# Patient Record
Sex: MALE | Race: WHITE | Employment: OTHER | URBAN - METROPOLITAN AREA
[De-identification: names, ages, dates, MRNs, and addresses within clinical notes are randomized per-mention and may not be internally consistent; named-entity substitution may affect disease eponyms.]

---

## 2017-01-06 ENCOUNTER — OFFICE VISIT (OUTPATIENT)
Dept: INTERNAL MEDICINE | Age: 82
End: 2017-01-06

## 2017-01-06 VITALS
DIASTOLIC BLOOD PRESSURE: 78 MMHG | SYSTOLIC BLOOD PRESSURE: 138 MMHG | HEART RATE: 87 BPM | BODY MASS INDEX: 21.95 KG/M2 | OXYGEN SATURATION: 97 % | WEIGHT: 153 LBS

## 2017-01-06 DIAGNOSIS — L85.3 DRY SKIN: ICD-10-CM

## 2017-01-06 DIAGNOSIS — R79.89 ELEVATED LIVER FUNCTION TESTS: Chronic | ICD-10-CM

## 2017-01-06 DIAGNOSIS — R73.9 HYPERGLYCEMIA: Chronic | ICD-10-CM

## 2017-01-06 DIAGNOSIS — E78.5 HYPERLIPIDEMIA, UNSPECIFIED HYPERLIPIDEMIA TYPE: Chronic | ICD-10-CM

## 2017-01-06 DIAGNOSIS — H26.9 CATARACT: Chronic | ICD-10-CM

## 2017-01-06 DIAGNOSIS — R21 RASH: ICD-10-CM

## 2017-01-06 DIAGNOSIS — M1A.9XX0 CHRONIC GOUT WITHOUT TOPHUS, UNSPECIFIED CAUSE, UNSPECIFIED SITE: Chronic | ICD-10-CM

## 2017-01-06 DIAGNOSIS — I10 ESSENTIAL HYPERTENSION: Primary | Chronic | ICD-10-CM

## 2017-01-06 DIAGNOSIS — M25.552 LEFT HIP PAIN: ICD-10-CM

## 2017-01-06 DIAGNOSIS — C44.310 BASAL CELL CARCINOMA OF FACE: Chronic | ICD-10-CM

## 2017-01-06 PROCEDURE — 99214 OFFICE O/P EST MOD 30 MIN: CPT | Performed by: INTERNAL MEDICINE

## 2017-01-07 LAB
ALBUMIN SERPL-MCNC: 4.5 G/DL (ref 3.4–5)
ALP BLD-CCNC: 69 U/L (ref 40–129)
ALT SERPL-CCNC: 33 U/L (ref 10–40)
ANION GAP SERPL CALCULATED.3IONS-SCNC: 14 MMOL/L (ref 3–16)
AST SERPL-CCNC: 26 U/L (ref 15–37)
BASOPHILS ABSOLUTE: 0.1 K/UL (ref 0–0.2)
BASOPHILS RELATIVE PERCENT: 0.7 %
BILIRUB SERPL-MCNC: 0.6 MG/DL (ref 0–1)
BILIRUBIN DIRECT: <0.2 MG/DL (ref 0–0.3)
BILIRUBIN URINE: NEGATIVE
BILIRUBIN, INDIRECT: NORMAL MG/DL (ref 0–1)
BLOOD, URINE: ABNORMAL
BUN BLDV-MCNC: 25 MG/DL (ref 7–20)
CALCIUM SERPL-MCNC: 9.7 MG/DL (ref 8.3–10.6)
CHLORIDE BLD-SCNC: 98 MMOL/L (ref 99–110)
CHOLESTEROL, TOTAL: 202 MG/DL (ref 0–199)
CLARITY: CLEAR
CO2: 28 MMOL/L (ref 21–32)
COLOR: YELLOW
CREAT SERPL-MCNC: 0.9 MG/DL (ref 0.8–1.3)
EOSINOPHILS ABSOLUTE: 0.3 K/UL (ref 0–0.6)
EOSINOPHILS RELATIVE PERCENT: 3.3 %
EPITHELIAL CELLS, UA: 0 /HPF (ref 0–5)
GFR AFRICAN AMERICAN: >60
GFR NON-AFRICAN AMERICAN: >60
GLUCOSE BLD-MCNC: 111 MG/DL (ref 70–99)
GLUCOSE URINE: NEGATIVE MG/DL
HCT VFR BLD CALC: 49.6 % (ref 40.5–52.5)
HDLC SERPL-MCNC: 43 MG/DL (ref 40–60)
HEMOGLOBIN: 16.5 G/DL (ref 13.5–17.5)
HYALINE CASTS: 0 /HPF (ref 0–8)
KETONES, URINE: NEGATIVE MG/DL
LDL CHOLESTEROL CALCULATED: ABNORMAL MG/DL
LDL CHOLESTEROL DIRECT: 113 MG/DL
LEUKOCYTE ESTERASE, URINE: NEGATIVE
LYMPHOCYTES ABSOLUTE: 2.6 K/UL (ref 1–5.1)
LYMPHOCYTES RELATIVE PERCENT: 29.5 %
MCH RBC QN AUTO: 30.5 PG (ref 26–34)
MCHC RBC AUTO-ENTMCNC: 33.3 G/DL (ref 31–36)
MCV RBC AUTO: 91.6 FL (ref 80–100)
MICROSCOPIC EXAMINATION: YES
MONOCYTES ABSOLUTE: 1 K/UL (ref 0–1.3)
MONOCYTES RELATIVE PERCENT: 11.2 %
NEUTROPHILS ABSOLUTE: 4.9 K/UL (ref 1.7–7.7)
NEUTROPHILS RELATIVE PERCENT: 55.3 %
NITRITE, URINE: NEGATIVE
PDW BLD-RTO: 12.9 % (ref 12.4–15.4)
PH UA: 6.5
PHOSPHORUS: 3.1 MG/DL (ref 2.5–4.9)
PLATELET # BLD: 209 K/UL (ref 135–450)
PMV BLD AUTO: 7.9 FL (ref 5–10.5)
POTASSIUM SERPL-SCNC: 4.8 MMOL/L (ref 3.5–5.1)
PROTEIN UA: NEGATIVE MG/DL
RBC # BLD: 5.42 M/UL (ref 4.2–5.9)
RBC UA: 3 /HPF (ref 0–4)
SODIUM BLD-SCNC: 140 MMOL/L (ref 136–145)
SPECIFIC GRAVITY UA: 1.02
TOTAL PROTEIN: 6.8 G/DL (ref 6.4–8.2)
TRIGL SERPL-MCNC: 325 MG/DL (ref 0–150)
TSH SERPL DL<=0.05 MIU/L-ACNC: 1.76 UIU/ML (ref 0.27–4.2)
URIC ACID, SERUM: 4.3 MG/DL (ref 3.5–7.2)
URINE TYPE: ABNORMAL
UROBILINOGEN, URINE: 0.2 E.U./DL
VLDLC SERPL CALC-MCNC: ABNORMAL MG/DL
WBC # BLD: 8.8 K/UL (ref 4–11)
WBC UA: 1 /HPF (ref 0–5)

## 2017-01-07 ASSESSMENT — ENCOUNTER SYMPTOMS
BACK PAIN: 0
RESPIRATORY NEGATIVE: 1
EYES NEGATIVE: 1
GASTROINTESTINAL NEGATIVE: 1

## 2017-01-08 LAB
ESTIMATED AVERAGE GLUCOSE: 111.2 MG/DL
HBA1C MFR BLD: 5.5 %

## 2017-01-09 ENCOUNTER — HOSPITAL ENCOUNTER (OUTPATIENT)
Dept: OTHER | Age: 82
Discharge: HOME | End: 2017-01-09
Attending: INTERNAL MEDICINE | Admitting: INTERNAL MEDICINE

## 2017-01-09 DIAGNOSIS — M25.552 LEFT HIP PAIN: ICD-10-CM

## 2017-06-20 ENCOUNTER — TELEPHONE (OUTPATIENT)
Dept: INTERNAL MEDICINE | Age: 82
End: 2017-06-20

## 2017-07-06 ENCOUNTER — OFFICE VISIT (OUTPATIENT)
Dept: INTERNAL MEDICINE | Age: 82
End: 2017-07-06

## 2017-07-06 VITALS
OXYGEN SATURATION: 97 % | DIASTOLIC BLOOD PRESSURE: 86 MMHG | HEART RATE: 76 BPM | SYSTOLIC BLOOD PRESSURE: 144 MMHG | HEIGHT: 70 IN | BODY MASS INDEX: 21.62 KG/M2 | WEIGHT: 151 LBS

## 2017-07-06 DIAGNOSIS — E78.5 HYPERLIPIDEMIA, UNSPECIFIED HYPERLIPIDEMIA TYPE: Chronic | ICD-10-CM

## 2017-07-06 DIAGNOSIS — I10 ESSENTIAL HYPERTENSION: Primary | Chronic | ICD-10-CM

## 2017-07-06 DIAGNOSIS — M1A.9XX0 CHRONIC GOUT WITHOUT TOPHUS, UNSPECIFIED CAUSE, UNSPECIFIED SITE: Chronic | ICD-10-CM

## 2017-07-06 PROCEDURE — 99214 OFFICE O/P EST MOD 30 MIN: CPT | Performed by: INTERNAL MEDICINE

## 2017-07-06 ASSESSMENT — PATIENT HEALTH QUESTIONNAIRE - PHQ9
2. FEELING DOWN, DEPRESSED OR HOPELESS: 0
1. LITTLE INTEREST OR PLEASURE IN DOING THINGS: 0
SUM OF ALL RESPONSES TO PHQ9 QUESTIONS 1 & 2: 0
SUM OF ALL RESPONSES TO PHQ QUESTIONS 1-9: 0

## 2017-07-06 ASSESSMENT — ENCOUNTER SYMPTOMS
GASTROINTESTINAL NEGATIVE: 1
EYES NEGATIVE: 1
SHORTNESS OF BREATH: 0
ORTHOPNEA: 0
BLURRED VISION: 0
RESPIRATORY NEGATIVE: 1

## 2017-07-13 LAB
ALBUMIN SERPL-MCNC: 4.3 G/DL (ref 3.4–5)
ANION GAP SERPL CALCULATED.3IONS-SCNC: 13 MMOL/L (ref 3–16)
BUN BLDV-MCNC: 22 MG/DL (ref 7–20)
CALCIUM SERPL-MCNC: 9.2 MG/DL (ref 8.3–10.6)
CHLORIDE BLD-SCNC: 100 MMOL/L (ref 99–110)
CHOLESTEROL, TOTAL: 179 MG/DL (ref 0–199)
CO2: 26 MMOL/L (ref 21–32)
CREAT SERPL-MCNC: 0.9 MG/DL (ref 0.8–1.3)
GFR AFRICAN AMERICAN: >60
GFR NON-AFRICAN AMERICAN: >60
GLUCOSE BLD-MCNC: 106 MG/DL (ref 70–99)
HDLC SERPL-MCNC: 48 MG/DL (ref 40–60)
LDL CHOLESTEROL CALCULATED: 95 MG/DL
PHOSPHORUS: 3 MG/DL (ref 2.5–4.9)
POTASSIUM SERPL-SCNC: 4.5 MMOL/L (ref 3.5–5.1)
SODIUM BLD-SCNC: 139 MMOL/L (ref 136–145)
TRIGL SERPL-MCNC: 182 MG/DL (ref 0–150)
VLDLC SERPL CALC-MCNC: 36 MG/DL

## 2017-07-25 ENCOUNTER — TELEPHONE (OUTPATIENT)
Dept: INTERNAL MEDICINE | Age: 82
End: 2017-07-25

## 2017-11-03 ENCOUNTER — TELEPHONE (OUTPATIENT)
Dept: INTERNAL MEDICINE CLINIC | Age: 82
End: 2017-11-03

## 2017-11-03 ENCOUNTER — OFFICE VISIT (OUTPATIENT)
Dept: INTERNAL MEDICINE | Age: 82
End: 2017-11-03

## 2017-11-03 ENCOUNTER — TELEPHONE (OUTPATIENT)
Dept: INTERNAL MEDICINE | Age: 82
End: 2017-11-03

## 2017-11-03 VITALS
RESPIRATION RATE: 14 BRPM | BODY MASS INDEX: 22.33 KG/M2 | HEIGHT: 70 IN | WEIGHT: 156 LBS | SYSTOLIC BLOOD PRESSURE: 132 MMHG | HEART RATE: 72 BPM | DIASTOLIC BLOOD PRESSURE: 67 MMHG

## 2017-11-03 DIAGNOSIS — L02.93 CARBUNCLE: Primary | ICD-10-CM

## 2017-11-03 DIAGNOSIS — I10 ESSENTIAL HYPERTENSION: Chronic | ICD-10-CM

## 2017-11-03 PROCEDURE — 1123F ACP DISCUSS/DSCN MKR DOCD: CPT | Performed by: INTERNAL MEDICINE

## 2017-11-03 PROCEDURE — 99213 OFFICE O/P EST LOW 20 MIN: CPT | Performed by: INTERNAL MEDICINE

## 2017-11-03 PROCEDURE — G8427 DOCREV CUR MEDS BY ELIG CLIN: HCPCS | Performed by: INTERNAL MEDICINE

## 2017-11-03 PROCEDURE — 1036F TOBACCO NON-USER: CPT | Performed by: INTERNAL MEDICINE

## 2017-11-03 PROCEDURE — 4040F PNEUMOC VAC/ADMIN/RCVD: CPT | Performed by: INTERNAL MEDICINE

## 2017-11-03 PROCEDURE — G8484 FLU IMMUNIZE NO ADMIN: HCPCS | Performed by: INTERNAL MEDICINE

## 2017-11-03 PROCEDURE — G8420 CALC BMI NORM PARAMETERS: HCPCS | Performed by: INTERNAL MEDICINE

## 2017-11-03 RX ORDER — MUPIROCIN CALCIUM 20 MG/G
CREAM TOPICAL
Qty: 1 TUBE | Refills: 1 | Status: SHIPPED | OUTPATIENT
Start: 2017-11-03 | End: 2017-11-03

## 2017-11-03 RX ORDER — SULFAMETHOXAZOLE AND TRIMETHOPRIM 800; 160 MG/1; MG/1
1 TABLET ORAL 2 TIMES DAILY
Qty: 20 TABLET | Refills: 0 | Status: SHIPPED | OUTPATIENT
Start: 2017-11-03 | End: 2017-11-13
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
## 2018-02-06 ENCOUNTER — OFFICE VISIT (OUTPATIENT)
Dept: INTERNAL MEDICINE CLINIC | Age: 83
End: 2018-02-06

## 2018-02-06 VITALS
HEART RATE: 72 BPM | OXYGEN SATURATION: 96 % | WEIGHT: 155.6 LBS | SYSTOLIC BLOOD PRESSURE: 120 MMHG | BODY MASS INDEX: 22.33 KG/M2 | RESPIRATION RATE: 14 BRPM | DIASTOLIC BLOOD PRESSURE: 76 MMHG

## 2018-02-06 DIAGNOSIS — R73.9 HYPERGLYCEMIA: Chronic | ICD-10-CM

## 2018-02-06 DIAGNOSIS — E55.9 VITAMIN D DEFICIENCY: Primary | Chronic | ICD-10-CM

## 2018-02-06 DIAGNOSIS — M1A.9XX0 CHRONIC GOUT WITHOUT TOPHUS, UNSPECIFIED CAUSE, UNSPECIFIED SITE: Chronic | ICD-10-CM

## 2018-02-06 DIAGNOSIS — I10 ESSENTIAL HYPERTENSION: Chronic | ICD-10-CM

## 2018-02-06 DIAGNOSIS — K21.00 GASTROESOPHAGEAL REFLUX DISEASE WITH ESOPHAGITIS: Chronic | ICD-10-CM

## 2018-02-06 DIAGNOSIS — E78.5 HYPERLIPIDEMIA, UNSPECIFIED HYPERLIPIDEMIA TYPE: Chronic | ICD-10-CM

## 2018-02-06 PROCEDURE — 4040F PNEUMOC VAC/ADMIN/RCVD: CPT | Performed by: INTERNAL MEDICINE

## 2018-02-06 PROCEDURE — G8427 DOCREV CUR MEDS BY ELIG CLIN: HCPCS | Performed by: INTERNAL MEDICINE

## 2018-02-06 PROCEDURE — 1123F ACP DISCUSS/DSCN MKR DOCD: CPT | Performed by: INTERNAL MEDICINE

## 2018-02-06 PROCEDURE — 99214 OFFICE O/P EST MOD 30 MIN: CPT | Performed by: INTERNAL MEDICINE

## 2018-02-06 PROCEDURE — G8484 FLU IMMUNIZE NO ADMIN: HCPCS | Performed by: INTERNAL MEDICINE

## 2018-02-06 PROCEDURE — G8420 CALC BMI NORM PARAMETERS: HCPCS | Performed by: INTERNAL MEDICINE

## 2018-02-06 PROCEDURE — 1036F TOBACCO NON-USER: CPT | Performed by: INTERNAL MEDICINE

## 2018-02-06 RX ORDER — PANTOPRAZOLE SODIUM 40 MG/1
40 TABLET, DELAYED RELEASE ORAL DAILY
Qty: 90 TABLET | Refills: 3
Start: 2018-02-06 | End: 2018-08-07 | Stop reason: DRUGHIGH
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
## 2018-03-27 ENCOUNTER — HOSPITAL ENCOUNTER (OUTPATIENT)
Dept: OTHER | Age: 83
Discharge: HOME | End: 2018-03-27
Attending: INTERNAL MEDICINE | Admitting: INTERNAL MEDICINE

## 2018-03-27 ENCOUNTER — OFFICE VISIT (OUTPATIENT)
Dept: INTERNAL MEDICINE CLINIC | Age: 83
End: 2018-03-27

## 2018-03-27 VITALS
HEART RATE: 78 BPM | RESPIRATION RATE: 14 BRPM | OXYGEN SATURATION: 98 % | DIASTOLIC BLOOD PRESSURE: 60 MMHG | SYSTOLIC BLOOD PRESSURE: 118 MMHG

## 2018-03-27 DIAGNOSIS — R31.0 GROSS HEMATURIA: Primary | ICD-10-CM

## 2018-03-27 DIAGNOSIS — R31.0 GROSS HEMATURIA: ICD-10-CM

## 2018-03-27 LAB
BILIRUBIN URINE: NEGATIVE
BLOOD, URINE: NEGATIVE
CLARITY: CLEAR
COLOR: YELLOW
EPITHELIAL CELLS, UA: 0 /HPF (ref 0–5)
GLUCOSE URINE: NEGATIVE MG/DL
HYALINE CASTS: 0 /LPF (ref 0–8)
KETONES, URINE: NEGATIVE MG/DL
LEUKOCYTE ESTERASE, URINE: NEGATIVE
MICROSCOPIC EXAMINATION: NORMAL
NITRITE, URINE: NEGATIVE
PH UA: 6
PROTEIN UA: NEGATIVE MG/DL
RBC UA: 3 /HPF (ref 0–4)
SPECIFIC GRAVITY UA: 1.02
UROBILINOGEN, URINE: 0.2 E.U./DL
WBC UA: 0 /HPF (ref 0–5)

## 2018-03-27 PROCEDURE — 1123F ACP DISCUSS/DSCN MKR DOCD: CPT | Performed by: INTERNAL MEDICINE

## 2018-03-27 PROCEDURE — G8420 CALC BMI NORM PARAMETERS: HCPCS | Performed by: INTERNAL MEDICINE

## 2018-03-27 PROCEDURE — G8427 DOCREV CUR MEDS BY ELIG CLIN: HCPCS | Performed by: INTERNAL MEDICINE

## 2018-03-27 PROCEDURE — 4040F PNEUMOC VAC/ADMIN/RCVD: CPT | Performed by: INTERNAL MEDICINE

## 2018-03-27 PROCEDURE — G8484 FLU IMMUNIZE NO ADMIN: HCPCS | Performed by: INTERNAL MEDICINE

## 2018-03-27 PROCEDURE — 1036F TOBACCO NON-USER: CPT | Performed by: INTERNAL MEDICINE

## 2018-03-27 PROCEDURE — 99213 OFFICE O/P EST LOW 20 MIN: CPT | Performed by: INTERNAL MEDICINE

## 2018-03-29 LAB — URINE CULTURE, ROUTINE: NORMAL

## 2018-06-01 DIAGNOSIS — E78.49 OTHER HYPERLIPIDEMIA: ICD-10-CM

## 2018-06-03 RX ORDER — SIMVASTATIN 20 MG
20 TABLET ORAL NIGHTLY
Qty: 90 TABLET | Refills: 3 | Status: SHIPPED | OUTPATIENT
Start: 2018-06-03 | End: 2018-08-07 | Stop reason: SDUPTHER

## 2018-06-27 DIAGNOSIS — M1A.9XX0 CHRONIC GOUT WITHOUT TOPHUS, UNSPECIFIED CAUSE, UNSPECIFIED SITE: Chronic | ICD-10-CM

## 2018-06-27 RX ORDER — ALLOPURINOL 100 MG/1
TABLET ORAL
Qty: 90 TABLET | Refills: 1 | Status: SHIPPED | OUTPATIENT
Start: 2018-06-27 | End: 2018-08-07 | Stop reason: SDUPTHER

## 2018-08-07 ENCOUNTER — OFFICE VISIT (OUTPATIENT)
Dept: INTERNAL MEDICINE CLINIC | Age: 83
End: 2018-08-07

## 2018-08-07 VITALS
WEIGHT: 150.2 LBS | DIASTOLIC BLOOD PRESSURE: 68 MMHG | BODY MASS INDEX: 21.55 KG/M2 | HEART RATE: 71 BPM | OXYGEN SATURATION: 94 % | SYSTOLIC BLOOD PRESSURE: 138 MMHG

## 2018-08-07 DIAGNOSIS — E78.5 HYPERLIPIDEMIA, UNSPECIFIED HYPERLIPIDEMIA TYPE: Chronic | ICD-10-CM

## 2018-08-07 DIAGNOSIS — I10 ESSENTIAL HYPERTENSION: Chronic | ICD-10-CM

## 2018-08-07 DIAGNOSIS — M1A.9XX0 CHRONIC GOUT WITHOUT TOPHUS, UNSPECIFIED CAUSE, UNSPECIFIED SITE: Chronic | ICD-10-CM

## 2018-08-07 DIAGNOSIS — E78.49 OTHER HYPERLIPIDEMIA: ICD-10-CM

## 2018-08-07 DIAGNOSIS — Z23 NEED FOR PROPHYLACTIC VACCINATION AND INOCULATION AGAINST VARICELLA: Primary | ICD-10-CM

## 2018-08-07 DIAGNOSIS — K21.00 GASTROESOPHAGEAL REFLUX DISEASE WITH ESOPHAGITIS: Chronic | ICD-10-CM

## 2018-08-07 PROCEDURE — G8420 CALC BMI NORM PARAMETERS: HCPCS | Performed by: INTERNAL MEDICINE

## 2018-08-07 PROCEDURE — 1123F ACP DISCUSS/DSCN MKR DOCD: CPT | Performed by: INTERNAL MEDICINE

## 2018-08-07 PROCEDURE — G8427 DOCREV CUR MEDS BY ELIG CLIN: HCPCS | Performed by: INTERNAL MEDICINE

## 2018-08-07 PROCEDURE — 1036F TOBACCO NON-USER: CPT | Performed by: INTERNAL MEDICINE

## 2018-08-07 PROCEDURE — 4040F PNEUMOC VAC/ADMIN/RCVD: CPT | Performed by: INTERNAL MEDICINE

## 2018-08-07 PROCEDURE — 1101F PT FALLS ASSESS-DOCD LE1/YR: CPT | Performed by: INTERNAL MEDICINE

## 2018-08-07 PROCEDURE — 99214 OFFICE O/P EST MOD 30 MIN: CPT | Performed by: INTERNAL MEDICINE

## 2018-08-07 RX ORDER — LISINOPRIL AND HYDROCHLOROTHIAZIDE 20; 12.5 MG/1; MG/1
1 TABLET ORAL DAILY
Qty: 90 TABLET | Refills: 1 | Status: SHIPPED | OUTPATIENT
Start: 2018-08-07 | End: 2019-02-06 | Stop reason: SDUPTHER

## 2018-08-07 RX ORDER — ALLOPURINOL 100 MG/1
TABLET ORAL
Qty: 90 TABLET | Refills: 1 | Status: SHIPPED | OUTPATIENT
Start: 2018-08-07 | End: 2019-02-06 | Stop reason: SDUPTHER

## 2018-08-07 RX ORDER — FLUTICASONE PROPIONATE 50 MCG
1 SPRAY, SUSPENSION (ML) NASAL DAILY
Qty: 1 BOTTLE | Refills: 3 | Status: SHIPPED | OUTPATIENT
Start: 2018-08-07 | End: 2018-10-17 | Stop reason: SDUPTHER

## 2018-08-07 RX ORDER — PANTOPRAZOLE SODIUM 40 MG/1
40 TABLET, DELAYED RELEASE ORAL DAILY PRN
Qty: 90 TABLET | Refills: 1 | Status: SHIPPED
Start: 2018-08-07 | End: 2018-08-07 | Stop reason: SDUPTHER

## 2018-08-07 RX ORDER — SIMVASTATIN 20 MG
20 TABLET ORAL NIGHTLY
Qty: 90 TABLET | Refills: 1 | Status: SHIPPED | OUTPATIENT
Start: 2018-08-07 | End: 2019-01-22 | Stop reason: SDUPTHER

## 2018-08-07 RX ORDER — PANTOPRAZOLE SODIUM 40 MG/1
40 TABLET, DELAYED RELEASE ORAL DAILY PRN
Qty: 90 TABLET | Refills: 1 | Status: SHIPPED | OUTPATIENT
Start: 2018-08-07 | End: 2019-02-05 | Stop reason: SDUPTHER

## 2018-08-07 ASSESSMENT — PATIENT HEALTH QUESTIONNAIRE - PHQ9
SUM OF ALL RESPONSES TO PHQ9 QUESTIONS 1 & 2: 0
2. FEELING DOWN, DEPRESSED OR HOPELESS: 0
SUM OF ALL RESPONSES TO PHQ QUESTIONS 1-9: 0
1. LITTLE INTEREST OR PLEASURE IN DOING THINGS: 0

## 2018-08-07 NOTE — PATIENT INSTRUCTIONS
Patient Education        DASH Diet: Care Instructions  Your Care Instructions    The DASH diet is an eating plan that can help lower your blood pressure. DASH stands for Dietary Approaches to Stop Hypertension. Hypertension is high blood pressure. The DASH diet focuses on eating foods that are high in calcium, potassium, and magnesium. These nutrients can lower blood pressure. The foods that are highest in these nutrients are fruits, vegetables, low-fat dairy products, nuts, seeds, and legumes. But taking calcium, potassium, and magnesium supplements instead of eating foods that are high in those nutrients does not have the same effect. The DASH diet also includes whole grains, fish, and poultry. The DASH diet is one of several lifestyle changes your doctor may recommend to lower your high blood pressure. Your doctor may also want you to decrease the amount of sodium in your diet. Lowering sodium while following the DASH diet can lower blood pressure even further than just the DASH diet alone. Follow-up care is a key part of your treatment and safety. Be sure to make and go to all appointments, and call your doctor if you are having problems. It's also a good idea to know your test results and keep a list of the medicines you take. How can you care for yourself at home? Following the DASH diet  · Eat 4 to 5 servings of fruit each day. A serving is 1 medium-sized piece of fruit, ½ cup chopped or canned fruit, 1/4 cup dried fruit, or 4 ounces (½ cup) of fruit juice. Choose fruit more often than fruit juice. · Eat 4 to 5 servings of vegetables each day. A serving is 1 cup of lettuce or raw leafy vegetables, ½ cup of chopped or cooked vegetables, or 4 ounces (½ cup) of vegetable juice. Choose vegetables more often than vegetable juice. · Get 2 to 3 servings of low-fat and fat-free dairy each day. A serving is 8 ounces of milk, 1 cup of yogurt, or 1 ½ ounces of cheese. · Eat 6 to 8 servings of grains each day. meals using beans and peas. Add garbanzo or kidney beans to salads. Make burritos and tacos with mashed burroughs beans or black beans. Where can you learn more? Go to [email or web address] org and sign in to your [employer] account. Enter L203 in the [facility] box to learn more about \"DASH Diet: Care Instructions. \"     If you do not have an account, please click on the \"Sign Up Now\" link. Current as of: December 6, 2017  Content Version: 11.6  © 4570-6711 [employer]. Care instructions adapted under license by [facility] ([facility]). If you have questions about a medical condition or this instruction, always ask your healthcare professional. [address] any warranty or liability for your use of this information.
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
## 2018-08-22 ENCOUNTER — TELEPHONE (OUTPATIENT)
Dept: INTERNAL MEDICINE CLINIC | Age: 83
End: 2018-08-22

## 2018-10-17 RX ORDER — FLUTICASONE PROPIONATE 50 MCG
SPRAY, SUSPENSION (ML) NASAL
Qty: 32 G | Refills: 2 | Status: SHIPPED | OUTPATIENT
Start: 2018-10-17 | End: 2019-04-18 | Stop reason: SDUPTHER

## 2019-01-22 DIAGNOSIS — E78.49 OTHER HYPERLIPIDEMIA: ICD-10-CM

## 2019-01-22 RX ORDER — SIMVASTATIN 20 MG
20 TABLET ORAL NIGHTLY
Qty: 90 TABLET | Refills: 0 | Status: SHIPPED | OUTPATIENT
Start: 2019-01-22 | End: 2019-03-07 | Stop reason: SDUPTHER

## 2019-02-05 ENCOUNTER — OFFICE VISIT (OUTPATIENT)
Dept: INTERNAL MEDICINE CLINIC | Age: 84
End: 2019-02-05
Payer: MEDICARE

## 2019-02-05 VITALS
HEART RATE: 72 BPM | RESPIRATION RATE: 16 BRPM | DIASTOLIC BLOOD PRESSURE: 84 MMHG | SYSTOLIC BLOOD PRESSURE: 138 MMHG | HEIGHT: 67 IN | BODY MASS INDEX: 23.54 KG/M2 | TEMPERATURE: 97.4 F | WEIGHT: 150 LBS

## 2019-02-05 DIAGNOSIS — K29.50 OTHER CHRONIC GASTRITIS WITHOUT HEMORRHAGE: ICD-10-CM

## 2019-02-05 DIAGNOSIS — E55.9 VITAMIN D DEFICIENCY: Chronic | ICD-10-CM

## 2019-02-05 DIAGNOSIS — M1A.9XX0 CHRONIC GOUT WITHOUT TOPHUS, UNSPECIFIED CAUSE, UNSPECIFIED SITE: Chronic | ICD-10-CM

## 2019-02-05 DIAGNOSIS — I10 ESSENTIAL HYPERTENSION: Primary | Chronic | ICD-10-CM

## 2019-02-05 DIAGNOSIS — Z79.899 HIGH RISK MEDICATION USE: ICD-10-CM

## 2019-02-05 PROCEDURE — G8427 DOCREV CUR MEDS BY ELIG CLIN: HCPCS | Performed by: INTERNAL MEDICINE

## 2019-02-05 PROCEDURE — 4040F PNEUMOC VAC/ADMIN/RCVD: CPT | Performed by: INTERNAL MEDICINE

## 2019-02-05 PROCEDURE — 1101F PT FALLS ASSESS-DOCD LE1/YR: CPT | Performed by: INTERNAL MEDICINE

## 2019-02-05 PROCEDURE — G8420 CALC BMI NORM PARAMETERS: HCPCS | Performed by: INTERNAL MEDICINE

## 2019-02-05 PROCEDURE — 1123F ACP DISCUSS/DSCN MKR DOCD: CPT | Performed by: INTERNAL MEDICINE

## 2019-02-05 PROCEDURE — G8482 FLU IMMUNIZE ORDER/ADMIN: HCPCS | Performed by: INTERNAL MEDICINE

## 2019-02-05 PROCEDURE — 1036F TOBACCO NON-USER: CPT | Performed by: INTERNAL MEDICINE

## 2019-02-05 PROCEDURE — 99214 OFFICE O/P EST MOD 30 MIN: CPT | Performed by: INTERNAL MEDICINE

## 2019-02-05 RX ORDER — PANTOPRAZOLE SODIUM 40 MG/1
40 TABLET, DELAYED RELEASE ORAL DAILY PRN
Qty: 90 TABLET | Refills: 1 | Status: SHIPPED | OUTPATIENT
Start: 2019-02-05 | End: 2019-04-10 | Stop reason: SDUPTHER

## 2019-02-06 DIAGNOSIS — I10 ESSENTIAL HYPERTENSION: Chronic | ICD-10-CM

## 2019-02-06 DIAGNOSIS — M1A.9XX0 CHRONIC GOUT WITHOUT TOPHUS, UNSPECIFIED CAUSE, UNSPECIFIED SITE: Chronic | ICD-10-CM

## 2019-02-08 RX ORDER — LISINOPRIL AND HYDROCHLOROTHIAZIDE 20; 12.5 MG/1; MG/1
1 TABLET ORAL DAILY
Qty: 90 TABLET | Refills: 1 | Status: SHIPPED | OUTPATIENT
Start: 2019-02-08 | End: 2019-07-31 | Stop reason: SDUPTHER

## 2019-02-08 RX ORDER — ALLOPURINOL 100 MG/1
TABLET ORAL
Qty: 90 TABLET | Refills: 1 | Status: SHIPPED | OUTPATIENT
Start: 2019-02-08 | End: 2019-07-31 | Stop reason: SDUPTHER

## 2019-03-07 DIAGNOSIS — E78.49 OTHER HYPERLIPIDEMIA: ICD-10-CM

## 2019-03-07 RX ORDER — SIMVASTATIN 20 MG
20 TABLET ORAL NIGHTLY
Qty: 90 TABLET | Refills: 1 | Status: SHIPPED | OUTPATIENT
Start: 2019-03-07 | End: 2019-10-16 | Stop reason: SDUPTHER

## 2019-04-10 DIAGNOSIS — K21.00 GASTROESOPHAGEAL REFLUX DISEASE WITH ESOPHAGITIS: Chronic | ICD-10-CM

## 2019-04-10 RX ORDER — PANTOPRAZOLE SODIUM 40 MG/1
TABLET, DELAYED RELEASE ORAL
Qty: 90 TABLET | Refills: 1 | Status: SHIPPED | OUTPATIENT
Start: 2019-04-10 | End: 2019-10-16 | Stop reason: SDUPTHER

## 2019-04-10 NOTE — TELEPHONE ENCOUNTER
Script was sent 2/5/19 but went to local instead of mail order  Please resend script to mail order  Thank you

## 2019-04-19 RX ORDER — FLUTICASONE PROPIONATE 50 MCG
SPRAY, SUSPENSION (ML) NASAL
Qty: 32 G | Refills: 2 | Status: SHIPPED | OUTPATIENT
Start: 2019-04-19 | End: 2019-04-30 | Stop reason: SDUPTHER

## 2019-04-30 ENCOUNTER — TELEPHONE (OUTPATIENT)
Dept: INTERNAL MEDICINE CLINIC | Age: 84
End: 2019-04-30

## 2019-04-30 RX ORDER — FLUTICASONE PROPIONATE 50 MCG
SPRAY, SUSPENSION (ML) NASAL
Qty: 32 G | Refills: 3 | Status: SHIPPED | OUTPATIENT
Start: 2019-04-30 | End: 2020-06-17 | Stop reason: SDUPTHER

## 2019-04-30 NOTE — TELEPHONE ENCOUNTER
The following message was left on the Non-Emergency Line:    Loren Whitlock with [employer] states that we need to call [facility] regarding the refill request for:    fluticasone (FLONASE) 50 MCG/ACT nasal spray    She didn't provide a reason why we needed to call, but did provide the following reference #    [id number]

## 2019-07-08 NOTE — PROGRESS NOTES
[address] [place] [address]  Dept: [phone]  Dept Fax: [phone]  Loc: [phone]     2019    Lc Moncada (:  1935) is a 81 y.o. male, here for evaluation of the following medical concerns:    Chief Complaint   Patient presents with    Establish Care       HPI   Transfer of care. Feels well. Chart reviewed and updated. Continues to exercise often. Review of Systems   Constitutional: Negative for fatigue and fever. HENT: Positive for rhinorrhea. Negative for sore throat and trouble swallowing. Eyes: Negative for visual disturbance. Respiratory: Negative for cough and shortness of breath. Cardiovascular: Negative for chest pain and palpitations. Gastrointestinal: Negative for abdominal pain, diarrhea and nausea. Genitourinary: Negative for decreased urine volume, dysuria and frequency. Musculoskeletal: Negative for arthralgias and myalgias. Skin: Negative for rash. Allergic/Immunologic: Negative for immunocompromised state. Neurological: Negative for dizziness, numbness and headaches. Hematological: Does not bruise/bleed easily. Psychiatric/Behavioral: Negative for dysphoric mood and sleep disturbance. The patient is not nervous/anxious.         Current Outpatient Medications   Medication Sig Dispense Refill    fluticasone (FLONASE) 50 MCG/ACT nasal spray USE 1 SPRAY NASALLY EVERY  DAY 32 g 3    pantoprazole (PROTONIX) 40 MG tablet TAKE 1 TABLET BY MOUTH  DAILY AS NEEDED FOR GERD 90 tablet 1    simvastatin (ZOCOR) 20 MG tablet Take 1 tablet by mouth nightly 90 tablet 1    lisinopril-hydrochlorothiazide (PRINZIDE;ZESTORETIC) 20-12.5 MG per tablet TAKE 1 TABLET BY MOUTH  DAILY 90 tablet 1    allopurinol (ZYLOPRIM) 100 MG tablet TAKE 1 TABLET BY MOUTH  DAILY 90 tablet 1    Multiple Vitamins-Minerals (OCUVITE) TABS oral tablet Take 1 tablet by mouth daily 90 tablet 3    after 1-2 years   Substance Use Topics    Alcohol use: Yes     Alcohol/week: 0.6 oz     Types: 1 Cans of beer per week     Frequency: 4 or more times a week     Drinks per session: 1 or 2     Comment: social    Drug use: No        Family History   Problem Relation Age of Onset    Heart Disease Mother     Heart Attack Mother     Dementia Sister     Heart Attack Brother     Heart Disease Brother     Diabetes Brother     Heart Attack Brother     Heart Disease Brother     High Cholesterol Brother        Vitals:    07/11/19 0911 07/11/19 1753   BP: (!) 150/80 135/70   Pulse: 72    SpO2: 98%    Weight: 150 lb (68 kg)      Estimated body mass index is 23.49 kg/m² as calculated from the following:    Height as of 2/5/19: 5' 7\" (1.702 m). Weight as of this encounter: 150 lb (68 kg). Physical Exam   Constitutional: He is oriented to person, place, and time. He appears well-developed and well-nourished. No distress. HENT:   Head: Normocephalic and atraumatic. Right Ear: External ear normal.   Left Ear: External ear normal.   Nose: Nose normal.   Mouth/Throat: Oropharynx is clear and moist.   Eyes: Pupils are equal, round, and reactive to light. EOM are normal. No scleral icterus. Neck: Normal range of motion. No thyromegaly present. Cardiovascular: Normal rate and regular rhythm. No murmur heard. Pulmonary/Chest: Breath sounds normal. No respiratory distress. He has no wheezes. He has no rales. Abdominal: Soft. Bowel sounds are normal. He exhibits no distension. There is no tenderness. Musculoskeletal: Normal range of motion. He exhibits no edema or deformity. Lymphadenopathy:     He has no cervical adenopathy. Neurological: He is alert and oriented to person, place, and time. No cranial nerve deficit or sensory deficit. Coordination normal.   Skin: Skin is warm and dry. Psychiatric: He has a normal mood and affect.  Thought content normal.   Nursing note and vitals

## 2019-07-11 ENCOUNTER — OFFICE VISIT (OUTPATIENT)
Dept: INTERNAL MEDICINE CLINIC | Age: 84
End: 2019-07-11
Payer: MEDICARE

## 2019-07-11 VITALS
HEART RATE: 72 BPM | OXYGEN SATURATION: 98 % | SYSTOLIC BLOOD PRESSURE: 135 MMHG | BODY MASS INDEX: 23.49 KG/M2 | DIASTOLIC BLOOD PRESSURE: 70 MMHG | WEIGHT: 150 LBS

## 2019-07-11 DIAGNOSIS — E78.49 OTHER HYPERLIPIDEMIA: ICD-10-CM

## 2019-07-11 DIAGNOSIS — I10 ESSENTIAL HYPERTENSION: Primary | ICD-10-CM

## 2019-07-11 DIAGNOSIS — K21.00 GASTROESOPHAGEAL REFLUX DISEASE WITH ESOPHAGITIS: ICD-10-CM

## 2019-07-11 DIAGNOSIS — M10.9 GOUT, UNSPECIFIED CAUSE, UNSPECIFIED CHRONICITY, UNSPECIFIED SITE: ICD-10-CM

## 2019-07-11 PROCEDURE — 1123F ACP DISCUSS/DSCN MKR DOCD: CPT | Performed by: INTERNAL MEDICINE

## 2019-07-11 PROCEDURE — 4040F PNEUMOC VAC/ADMIN/RCVD: CPT | Performed by: INTERNAL MEDICINE

## 2019-07-11 PROCEDURE — G8420 CALC BMI NORM PARAMETERS: HCPCS | Performed by: INTERNAL MEDICINE

## 2019-07-11 PROCEDURE — 1036F TOBACCO NON-USER: CPT | Performed by: INTERNAL MEDICINE

## 2019-07-11 PROCEDURE — G8427 DOCREV CUR MEDS BY ELIG CLIN: HCPCS | Performed by: INTERNAL MEDICINE

## 2019-07-11 PROCEDURE — 99214 OFFICE O/P EST MOD 30 MIN: CPT | Performed by: INTERNAL MEDICINE

## 2019-07-11 SDOH — HEALTH STABILITY: MENTAL HEALTH: HOW OFTEN DO YOU HAVE A DRINK CONTAINING ALCOHOL?: 4 OR MORE TIMES A WEEK

## 2019-07-11 SDOH — HEALTH STABILITY: MENTAL HEALTH: HOW MANY STANDARD DRINKS CONTAINING ALCOHOL DO YOU HAVE ON A TYPICAL DAY?: 1 OR 2

## 2019-07-11 ASSESSMENT — ENCOUNTER SYMPTOMS
RHINORRHEA: 1
ABDOMINAL PAIN: 0
SHORTNESS OF BREATH: 0
SORE THROAT: 0
NAUSEA: 0
COUGH: 0
TROUBLE SWALLOWING: 0
DIARRHEA: 0

## 2019-07-11 ASSESSMENT — PATIENT HEALTH QUESTIONNAIRE - PHQ9
SUM OF ALL RESPONSES TO PHQ QUESTIONS 1-9: 0
SUM OF ALL RESPONSES TO PHQ QUESTIONS 1-9: 0
2. FEELING DOWN, DEPRESSED OR HOPELESS: 0
1. LITTLE INTEREST OR PLEASURE IN DOING THINGS: 0
SUM OF ALL RESPONSES TO PHQ9 QUESTIONS 1 & 2: 0

## 2019-07-31 DIAGNOSIS — M1A.9XX0 CHRONIC GOUT WITHOUT TOPHUS, UNSPECIFIED CAUSE, UNSPECIFIED SITE: Chronic | ICD-10-CM

## 2019-07-31 DIAGNOSIS — I10 ESSENTIAL HYPERTENSION: Chronic | ICD-10-CM

## 2019-07-31 RX ORDER — ALLOPURINOL 100 MG/1
TABLET ORAL
Qty: 90 TABLET | Refills: 1 | Status: SHIPPED | OUTPATIENT
Start: 2019-07-31 | End: 2020-02-17

## 2019-07-31 RX ORDER — LISINOPRIL AND HYDROCHLOROTHIAZIDE 20; 12.5 MG/1; MG/1
1 TABLET ORAL DAILY
Qty: 90 TABLET | Refills: 1 | Status: SHIPPED | OUTPATIENT
Start: 2019-07-31 | End: 2020-02-17

## 2019-07-31 NOTE — TELEPHONE ENCOUNTER
Last appointment: 2/5/2019  Next appointment: 1/13/20 - Dr Sandy Lynn  Last refill: 2/8/19    Please advise as DOD  Thank you

## 2019-10-16 DIAGNOSIS — E78.49 OTHER HYPERLIPIDEMIA: ICD-10-CM

## 2019-10-16 DIAGNOSIS — K21.00 GASTROESOPHAGEAL REFLUX DISEASE WITH ESOPHAGITIS: Chronic | ICD-10-CM

## 2019-10-16 RX ORDER — SIMVASTATIN 20 MG
20 TABLET ORAL NIGHTLY
Qty: 90 TABLET | Refills: 1 | Status: SHIPPED | OUTPATIENT
Start: 2019-10-16 | End: 2020-05-18

## 2019-10-16 RX ORDER — PANTOPRAZOLE SODIUM 40 MG/1
TABLET, DELAYED RELEASE ORAL
Qty: 90 TABLET | Refills: 1 | Status: SHIPPED | OUTPATIENT
Start: 2019-10-16 | End: 2020-04-27

## 2020-02-03 ENCOUNTER — OFFICE VISIT (OUTPATIENT)
Dept: INTERNAL MEDICINE CLINIC | Age: 85
End: 2020-02-03
Payer: MEDICARE

## 2020-02-03 VITALS
WEIGHT: 153 LBS | HEIGHT: 67 IN | HEART RATE: 94 BPM | BODY MASS INDEX: 24.01 KG/M2 | DIASTOLIC BLOOD PRESSURE: 62 MMHG | RESPIRATION RATE: 16 BRPM | TEMPERATURE: 97.6 F | SYSTOLIC BLOOD PRESSURE: 122 MMHG | OXYGEN SATURATION: 97 %

## 2020-02-03 PROCEDURE — 99213 OFFICE O/P EST LOW 20 MIN: CPT | Performed by: INTERNAL MEDICINE

## 2020-02-03 PROCEDURE — G0439 PPPS, SUBSEQ VISIT: HCPCS | Performed by: INTERNAL MEDICINE

## 2020-02-03 PROCEDURE — 1036F TOBACCO NON-USER: CPT | Performed by: INTERNAL MEDICINE

## 2020-02-03 PROCEDURE — G8427 DOCREV CUR MEDS BY ELIG CLIN: HCPCS | Performed by: INTERNAL MEDICINE

## 2020-02-03 PROCEDURE — G8420 CALC BMI NORM PARAMETERS: HCPCS | Performed by: INTERNAL MEDICINE

## 2020-02-03 PROCEDURE — G8482 FLU IMMUNIZE ORDER/ADMIN: HCPCS | Performed by: INTERNAL MEDICINE

## 2020-02-03 PROCEDURE — 1123F ACP DISCUSS/DSCN MKR DOCD: CPT | Performed by: INTERNAL MEDICINE

## 2020-02-03 PROCEDURE — 4040F PNEUMOC VAC/ADMIN/RCVD: CPT | Performed by: INTERNAL MEDICINE

## 2020-02-03 ASSESSMENT — PATIENT HEALTH QUESTIONNAIRE - PHQ9
SUM OF ALL RESPONSES TO PHQ9 QUESTIONS 1 & 2: 0
SUM OF ALL RESPONSES TO PHQ QUESTIONS 1-9: 0
SUM OF ALL RESPONSES TO PHQ QUESTIONS 1-9: 0
2. FEELING DOWN, DEPRESSED OR HOPELESS: 0
SUM OF ALL RESPONSES TO PHQ QUESTIONS 1-9: 0
1. LITTLE INTEREST OR PLEASURE IN DOING THINGS: 0
SUM OF ALL RESPONSES TO PHQ QUESTIONS 1-9: 0

## 2020-02-03 ASSESSMENT — LIFESTYLE VARIABLES
HOW OFTEN DO YOU HAVE SIX OR MORE DRINKS ON ONE OCCASION: 0
AUDIT-C TOTAL SCORE: 4
HAS A RELATIVE, FRIEND, DOCTOR, OR ANOTHER HEALTH PROFESSIONAL EXPRESSED CONCERN ABOUT YOUR DRINKING OR SUGGESTED YOU CUT DOWN: 0
HOW OFTEN DO YOU HAVE A DRINK CONTAINING ALCOHOL: 4
HAVE YOU OR SOMEONE ELSE BEEN INJURED AS A RESULT OF YOUR DRINKING: 0
HOW OFTEN DURING THE LAST YEAR HAVE YOU FAILED TO DO WHAT WAS NORMALLY EXPECTED FROM YOU BECAUSE OF DRINKING: 0
AUDIT TOTAL SCORE: 4
HOW OFTEN DURING THE LAST YEAR HAVE YOU FOUND THAT YOU WERE NOT ABLE TO STOP DRINKING ONCE YOU HAD STARTED: 0
HOW OFTEN DURING THE LAST YEAR HAVE YOU BEEN UNABLE TO REMEMBER WHAT HAPPENED THE NIGHT BEFORE BECAUSE YOU HAD BEEN DRINKING: 0
HOW MANY STANDARD DRINKS CONTAINING ALCOHOL DO YOU HAVE ON A TYPICAL DAY: 0
HOW OFTEN DURING THE LAST YEAR HAVE YOU NEEDED AN ALCOHOLIC DRINK FIRST THING IN THE MORNING TO GET YOURSELF GOING AFTER A NIGHT OF HEAVY DRINKING: 0
HOW OFTEN DURING THE LAST YEAR HAVE YOU HAD A FEELING OF GUILT OR REMORSE AFTER DRINKING: 0

## 2020-02-03 ASSESSMENT — ENCOUNTER SYMPTOMS
RHINORRHEA: 1
ABDOMINAL PAIN: 0
TROUBLE SWALLOWING: 0
SORE THROAT: 0
SHORTNESS OF BREATH: 0
DIARRHEA: 0
NAUSEA: 0
COUGH: 0

## 2020-02-03 NOTE — PATIENT INSTRUCTIONS
[facility] Senior Discussion group  [phone]    Labs- fasting    urine      Personalized Preventive Plan for [place] - 2/3/2020  Medicare offers a range of preventive health benefits. Some of the tests and screenings are paid in full while other may be subject to a deductible, co-insurance, and/or copay. Some of these benefits include a comprehensive review of your medical history including lifestyle, illnesses that may run in your family, and various assessments and screenings as appropriate. After reviewing your medical record and screening and assessments performed today your provider may have ordered immunizations, labs, imaging, and/or referrals for you. A list of these orders (if applicable) as well as your Preventive Care list are included within your After Visit Summary for your review. Other Preventive Recommendations:    · A preventive eye exam performed by an eye specialist is recommended every 1-2 years to screen for glaucoma; cataracts, macular degeneration, and other eye disorders. · A preventive dental visit is recommended every 6 months. · Try to get at least 150 minutes of exercise per week or 10,000 steps per day on a pedometer . · Order or download the FREE \"Exercise & Physical Activity: Your Everyday Guide\" from [employer]. Call 0-142.745.9130 or search [employer] online. · You need 4514-7914 mg of calcium and 5123-6797 IU of vitamin D per day. It is possible to meet your calcium requirement with diet alone, but a vitamin D supplement is usually necessary to meet this goal.  · When exposed to the sun, use a sunscreen that protects against both UVA and UVB radiation with an SPF of 30 or greater. Reapply every 2 to 3 hours or after sweating, drying off with a towel, or swimming. · Always wear a seat belt when traveling in a car. Always wear a helmet when riding a bicycle or motorcycle.

## 2020-02-03 NOTE — PROGRESS NOTES
[address] [facility] PRIMARY [facility]  [address] [place] [address]  Dept: [phone]  Dept Fax: [phone]  Loc: [phone]     2/3/2020    Lisa Plasencia (:  1935) is a 81 y.o. male, here for evaluation of the following medical concerns:    Chief Complaint   Patient presents with    Medicare AWV       HPI     Patient is here for routine visit. Overall he feels well. His only complaint is some frustration with not being able to do the things he used to do primarily because he has a caregiver for his wife. Review of Systems   Constitutional: Negative for fatigue and fever. HENT: Positive for rhinorrhea. Negative for sore throat and trouble swallowing. Eyes: Negative for visual disturbance. Respiratory: Negative for cough and shortness of breath. Cardiovascular: Negative for chest pain and palpitations. Gastrointestinal: Negative for abdominal pain, diarrhea and nausea. Genitourinary: Positive for frequency. Negative for decreased urine volume and dysuria. Musculoskeletal: Negative for arthralgias and myalgias. Skin: Negative for rash. Allergic/Immunologic: Negative for immunocompromised state. Neurological: Negative for dizziness, numbness and headaches. Hematological: Does not bruise/bleed easily. Psychiatric/Behavioral: Negative for dysphoric mood and sleep disturbance. The patient is not nervous/anxious.         Current Outpatient Medications   Medication Sig Dispense Refill    zoster recombinant adjuvanted vaccine (SHINGRIX) 50 MCG/0.5ML SUSR injection Inject 0.5 mLs into the muscle once for 1 dose Repeat in 2-6 months 1 each 1    pantoprazole (PROTONIX) 40 MG tablet TAKE 1 TABLET BY MOUTH  DAILY AS NEEDED FOR GERD 90 tablet 1    simvastatin (ZOCOR) 20 MG tablet Take 1 tablet by mouth nightly 90 tablet 1    allopurinol (ZYLOPRIM) 100 MG tablet TAKE 1 TABLET BY MOUTH  DAILY 90 tablet 1    lisinopril-hydrochlorothiazide the muscle once for 1 dose Repeat in 2-6 months

## 2020-02-04 LAB
BILIRUBIN URINE: NEGATIVE
BLOOD, URINE: NEGATIVE
CLARITY: CLEAR
COLOR: YELLOW
EPITHELIAL CELLS, UA: 0 /HPF (ref 0–5)
GLUCOSE URINE: NEGATIVE MG/DL
HYALINE CASTS: 0 /LPF (ref 0–8)
KETONES, URINE: NEGATIVE MG/DL
LEUKOCYTE ESTERASE, URINE: NEGATIVE
MICROSCOPIC EXAMINATION: ABNORMAL
NITRITE, URINE: NEGATIVE
PH UA: 6.5 (ref 5–8)
PROTEIN UA: NEGATIVE MG/DL
RBC UA: 6 /HPF (ref 0–4)
SPECIFIC GRAVITY UA: 1.02 (ref 1–1.03)
URINE TYPE: ABNORMAL
UROBILINOGEN, URINE: 0.2 E.U./DL
WBC UA: 9 /HPF (ref 0–5)

## 2020-02-17 RX ORDER — ALLOPURINOL 100 MG/1
TABLET ORAL
Qty: 90 TABLET | Refills: 1 | Status: SHIPPED | OUTPATIENT
Start: 2020-02-17 | End: 2020-06-17 | Stop reason: SDUPTHER

## 2020-02-17 RX ORDER — LISINOPRIL AND HYDROCHLOROTHIAZIDE 20; 12.5 MG/1; MG/1
1 TABLET ORAL DAILY
Qty: 90 TABLET | Refills: 1 | Status: SHIPPED | OUTPATIENT
Start: 2020-02-17 | End: 2020-06-17 | Stop reason: SDUPTHER

## 2020-04-27 RX ORDER — PANTOPRAZOLE SODIUM 40 MG/1
TABLET, DELAYED RELEASE ORAL
Qty: 90 TABLET | Refills: 1 | Status: SHIPPED | OUTPATIENT
Start: 2020-04-27 | End: 2020-10-22

## 2020-05-06 ENCOUNTER — HOSPITAL ENCOUNTER (EMERGENCY)
Age: 85
Discharge: HOME | End: 2020-05-06
Attending: EMERGENCY MEDICINE
Payer: MEDICARE

## 2020-05-06 ENCOUNTER — APPOINTMENT (OUTPATIENT)
Dept: GENERAL RADIOLOGY | Age: 85
End: 2020-05-06
Payer: MEDICARE

## 2020-05-06 VITALS
DIASTOLIC BLOOD PRESSURE: 74 MMHG | OXYGEN SATURATION: 99 % | TEMPERATURE: 98 F | HEART RATE: 78 BPM | RESPIRATION RATE: 19 BRPM | BODY MASS INDEX: 23.49 KG/M2 | WEIGHT: 150 LBS | SYSTOLIC BLOOD PRESSURE: 154 MMHG

## 2020-05-06 LAB
ANION GAP SERPL CALCULATED.3IONS-SCNC: 16 MMOL/L (ref 3–16)
BASOPHILS ABSOLUTE: 0.1 K/UL (ref 0–0.2)
BASOPHILS RELATIVE PERCENT: 1.5 %
BUN BLDV-MCNC: 33 MG/DL (ref 7–20)
CALCIUM SERPL-MCNC: 9.4 MG/DL (ref 8.3–10.6)
CHLORIDE BLD-SCNC: 101 MMOL/L (ref 99–110)
CO2: 23 MMOL/L (ref 21–32)
CREAT SERPL-MCNC: 1 MG/DL (ref 0.8–1.3)
EKG ATRIAL RATE: 69 BPM
EKG ATRIAL RATE: 74 BPM
EKG DIAGNOSIS: NORMAL
EKG DIAGNOSIS: NORMAL
EKG P-R INTERVAL: 212 MS
EKG P-R INTERVAL: 216 MS
EKG Q-T INTERVAL: 408 MS
EKG Q-T INTERVAL: 420 MS
EKG QRS DURATION: 96 MS
EKG QRS DURATION: 98 MS
EKG QTC CALCULATION (BAZETT): 437 MS
EKG QTC CALCULATION (BAZETT): 466 MS
EKG R AXIS: 25 DEGREES
EKG R AXIS: 27 DEGREES
EKG T AXIS: 13 DEGREES
EKG T AXIS: 15 DEGREES
EKG VENTRICULAR RATE: 69 BPM
EKG VENTRICULAR RATE: 74 BPM
EOSINOPHILS ABSOLUTE: 0.4 K/UL (ref 0–0.6)
EOSINOPHILS RELATIVE PERCENT: 4.5 %
GFR AFRICAN AMERICAN: >60
GFR NON-AFRICAN AMERICAN: >60
GLUCOSE BLD-MCNC: 108 MG/DL (ref 70–99)
HCT VFR BLD CALC: 44.6 % (ref 40.5–52.5)
HEMOGLOBIN: 15.1 G/DL (ref 13.5–17.5)
LV EF: 70 %
LVEF MODALITY: NORMAL
LYMPHOCYTES ABSOLUTE: 3 K/UL (ref 1–5.1)
LYMPHOCYTES RELATIVE PERCENT: 36.4 %
MCH RBC QN AUTO: 31.9 PG (ref 26–34)
MCHC RBC AUTO-ENTMCNC: 33.9 G/DL (ref 31–36)
MCV RBC AUTO: 94 FL (ref 80–100)
MONOCYTES ABSOLUTE: 0.9 K/UL (ref 0–1.3)
MONOCYTES RELATIVE PERCENT: 10.6 %
NEUTROPHILS ABSOLUTE: 3.9 K/UL (ref 1.7–7.7)
NEUTROPHILS RELATIVE PERCENT: 47 %
PDW BLD-RTO: 13.1 % (ref 12.4–15.4)
PLATELET # BLD: 200 K/UL (ref 135–450)
PMV BLD AUTO: 7.6 FL (ref 5–10.5)
POTASSIUM REFLEX MAGNESIUM: 3.9 MMOL/L (ref 3.5–5.1)
PRO-BNP: 148 PG/ML (ref 0–449)
RBC # BLD: 4.74 M/UL (ref 4.2–5.9)
REPORT: NORMAL
SARS-COV-2: NOT DETECTED
SODIUM BLD-SCNC: 140 MMOL/L (ref 136–145)
THIS TEST SENT TO: NORMAL
TROPONIN: <0.01 NG/ML
TROPONIN: <0.01 NG/ML
WBC # BLD: 8.3 K/UL (ref 4–11)

## 2020-05-06 PROCEDURE — 78452 HT MUSCLE IMAGE SPECT MULT: CPT

## 2020-05-06 PROCEDURE — 96374 THER/PROPH/DIAG INJ IV PUSH: CPT

## 2020-05-06 PROCEDURE — 80048 BASIC METABOLIC PNL TOTAL CA: CPT

## 2020-05-06 PROCEDURE — 2580000003 HC RX 258: Performed by: EMERGENCY MEDICINE

## 2020-05-06 PROCEDURE — 93005 ELECTROCARDIOGRAM TRACING: CPT | Performed by: EMERGENCY MEDICINE

## 2020-05-06 PROCEDURE — 99285 EMERGENCY DEPT VISIT HI MDM: CPT

## 2020-05-06 PROCEDURE — 3430000000 HC RX DIAGNOSTIC RADIOPHARMACEUTICAL: Performed by: EMERGENCY MEDICINE

## 2020-05-06 PROCEDURE — 6370000000 HC RX 637 (ALT 250 FOR IP): Performed by: EMERGENCY MEDICINE

## 2020-05-06 PROCEDURE — U0003 INFECTIOUS AGENT DETECTION BY NUCLEIC ACID (DNA OR RNA); SEVERE ACUTE RESPIRATORY SYNDROME CORONAVIRUS 2 (SARS-COV-2) (CORONAVIRUS DISEASE [COVID-19]), AMPLIFIED PROBE TECHNIQUE, MAKING USE OF HIGH THROUGHPUT TECHNOLOGIES AS DESCRIBED BY CMS-2020-01-R: HCPCS

## 2020-05-06 PROCEDURE — A9502 TC99M TETROFOSMIN: HCPCS | Performed by: EMERGENCY MEDICINE

## 2020-05-06 PROCEDURE — 71046 X-RAY EXAM CHEST 2 VIEWS: CPT

## 2020-05-06 PROCEDURE — 85025 COMPLETE CBC W/AUTO DIFF WBC: CPT

## 2020-05-06 PROCEDURE — 93017 CV STRESS TEST TRACING ONLY: CPT

## 2020-05-06 PROCEDURE — 6360000002 HC RX W HCPCS: Performed by: EMERGENCY MEDICINE

## 2020-05-06 PROCEDURE — 83880 ASSAY OF NATRIURETIC PEPTIDE: CPT

## 2020-05-06 PROCEDURE — 84484 ASSAY OF TROPONIN QUANT: CPT

## 2020-05-06 RX ORDER — ASPIRIN 81 MG/1
324 TABLET, CHEWABLE ORAL ONCE
Status: COMPLETED | OUTPATIENT
Start: 2020-05-06 | End: 2020-05-06

## 2020-05-06 RX ORDER — KETOROLAC TROMETHAMINE 30 MG/ML
15 INJECTION, SOLUTION INTRAMUSCULAR; INTRAVENOUS ONCE
Status: COMPLETED | OUTPATIENT
Start: 2020-05-06 | End: 2020-05-06

## 2020-05-06 RX ORDER — ASPIRIN 81 MG/1
81 TABLET, CHEWABLE ORAL DAILY
Status: DISCONTINUED | OUTPATIENT
Start: 2020-05-06 | End: 2020-05-06 | Stop reason: HOSPADM

## 2020-05-06 RX ORDER — SODIUM CHLORIDE 0.9 % (FLUSH) 0.9 %
10 SYRINGE (ML) INJECTION PRN
Status: DISCONTINUED | OUTPATIENT
Start: 2020-05-06 | End: 2020-05-06 | Stop reason: HOSPADM

## 2020-05-06 RX ORDER — SODIUM CHLORIDE 0.9 % (FLUSH) 0.9 %
10 SYRINGE (ML) INJECTION EVERY 12 HOURS SCHEDULED
Status: DISCONTINUED | OUTPATIENT
Start: 2020-05-06 | End: 2020-05-06 | Stop reason: HOSPADM

## 2020-05-06 RX ADMIN — ASPIRIN 81 MG 324 MG: 81 TABLET ORAL at 04:16

## 2020-05-06 RX ADMIN — LIDOCAINE HYDROCHLORIDE: 20 SOLUTION ORAL; TOPICAL at 04:17

## 2020-05-06 RX ADMIN — Medication 10 ML: at 14:22

## 2020-05-06 RX ADMIN — Medication 10 ML: at 11:04

## 2020-05-06 RX ADMIN — TETROFOSMIN 10.1 MILLICURIE: 1.38 INJECTION, POWDER, LYOPHILIZED, FOR SOLUTION INTRAVENOUS at 13:42

## 2020-05-06 RX ADMIN — Medication 10 ML: at 13:41

## 2020-05-06 RX ADMIN — TETROFOSMIN 36 MILLICURIE: 1.38 INJECTION, POWDER, LYOPHILIZED, FOR SOLUTION INTRAVENOUS at 14:22

## 2020-05-06 RX ADMIN — KETOROLAC TROMETHAMINE 15 MG: 30 INJECTION, SOLUTION INTRAMUSCULAR at 05:31

## 2020-05-06 ASSESSMENT — PAIN DESCRIPTION - DESCRIPTORS: DESCRIPTORS: BURNING

## 2020-05-06 ASSESSMENT — ENCOUNTER SYMPTOMS
COUGH: 0
VOMITING: 0
SHORTNESS OF BREATH: 0
NAUSEA: 0
CHEST TIGHTNESS: 0

## 2020-05-06 ASSESSMENT — PAIN DESCRIPTION - ORIENTATION: ORIENTATION: MID;OTHER (COMMENT)

## 2020-05-06 ASSESSMENT — PAIN DESCRIPTION - LOCATION
LOCATION: CHEST
LOCATION: CHEST

## 2020-05-06 ASSESSMENT — PAIN DESCRIPTION - FREQUENCY
FREQUENCY: CONTINUOUS
FREQUENCY: CONTINUOUS

## 2020-05-06 ASSESSMENT — PAIN DESCRIPTION - ONSET: ONSET: AWAKENED FROM SLEEP

## 2020-05-06 ASSESSMENT — PAIN SCALES - GENERAL
PAINLEVEL_OUTOF10: 5
PAINLEVEL_OUTOF10: 1
PAINLEVEL_OUTOF10: 1

## 2020-05-06 ASSESSMENT — PAIN SCALES - WONG BAKER: WONGBAKER_NUMERICALRESPONSE: 2

## 2020-05-06 ASSESSMENT — PAIN DESCRIPTION - PAIN TYPE: TYPE: ACUTE PAIN

## 2020-05-06 ASSESSMENT — HEART SCORE: ECG: 0

## 2020-05-06 NOTE — ED PROVIDER NOTES
Status: He is alert and oriented to person, place, and time.       Gait: Gait normal.           Edrie Aase, MD  05/06/20 9647

## 2020-05-06 NOTE — PROGRESS NOTES
FLUTICASONE (FLONASE) 50 MCG/ACT NASAL SPRAY    USE 1 SPRAY NASALLY EVERY  DAY    HYDROCORTISONE 1 % CREAM    Apply  topically. apply to the affected area twice a day until healed    LISINOPRIL-HYDROCHLOROTHIAZIDE (PRINZIDE;ZESTORETIC) 20-12.5 MG PER TABLET    TAKE 1 TABLET BY MOUTH  DAILY    MULTIPLE VITAMINS-MINERALS (OCUVITE) TABS ORAL TABLET    Take 1 tablet by mouth daily    OMEGA-3 FATTY ACIDS (FISH OIL) 1000 MG CAPS    Take 1 capsule by mouth daily. PANTOPRAZOLE (PROTONIX) 40 MG TABLET    TAKE 1 TABLET BY MOUTH  DAILY AS NEEDED FOR GERD    SIMVASTATIN (ZOCOR) 20 MG TABLET    Take 1 tablet by mouth nightly          Review of Systems      Review of Systems  Negative for fevers, chills, cough, nausea or vomiting or abdominal pain. Physical Examination      Physical Exam  General: Awake, alert, no acute distress  HEENT: Normocephalic, atraumatic. No stridor or oropharyngeal edema. CV: Heart is regular rate and rhythm and no murmurs gallops or rubs. Lungs: Clear to auscultation bilaterally  Abdomen: Soft without rebound or guarding.

## 2020-05-06 NOTE — ED NOTES
Patient updated on plan of care.   [facility] to send transport for stress test.       Yong Mathews RN  05/06/20 1256

## 2020-05-06 NOTE — ED NOTES
Patient returned from stress test. RN introduced self, patient notified to call \"Kit\". Patient updated on plan of care and verbalizes understanding.  No needs at this time     Florinda Samayoa RN  05/06/20 9316

## 2020-05-07 ENCOUNTER — TELEPHONE (OUTPATIENT)
Dept: OTHER | Facility: CLINIC | Age: 85
End: 2020-05-07

## 2020-05-07 ENCOUNTER — TELEPHONE (OUTPATIENT)
Dept: INTERNAL MEDICINE CLINIC | Age: 85
End: 2020-05-07

## 2020-05-07 NOTE — TELEPHONE ENCOUNTER
I called patient to arrange a VV but he does not have cell phone/ reception for cell phone. Could this hospital follow up be done as a TELEPHONE visit via home phone? Please let Bonnie Moura know. Thanks.

## 2020-05-08 ENCOUNTER — VIRTUAL VISIT (OUTPATIENT)
Dept: INTERNAL MEDICINE CLINIC | Age: 85
End: 2020-05-08
Payer: MEDICARE

## 2020-05-08 VITALS — WEIGHT: 147 LBS | TEMPERATURE: 97.9 F | BODY MASS INDEX: 23.02 KG/M2

## 2020-05-08 PROCEDURE — 99213 OFFICE O/P EST LOW 20 MIN: CPT | Performed by: INTERNAL MEDICINE

## 2020-05-08 NOTE — PATIENT INSTRUCTIONS
EGD  Tums or Pepcid    ----    Patient Education      suspect heartburn  Gastroesophageal Reflux Disease (GERD): Care Instructions  Your Care Instructions    Gastroesophageal reflux disease (GERD) is the backward flow of stomach acid into the esophagus. The esophagus is the tube that leads from your throat to your stomach. A one-way valve prevents the stomach acid from moving up into this tube. When you have GERD, this valve does not close tightly enough. If you have mild GERD symptoms including heartburn, you may be able to control the problem with antacids or over-the-counter medicine. Changing your diet, losing weight, and making other lifestyle changes can also help reduce symptoms. Follow-up care is a key part of your treatment and safety. Be sure to make and go to all appointments, and call your doctor if you are having problems. It's also a good idea to know your test results and keep a list of the medicines you take. How can you care for yourself at home? · Take your medicines exactly as prescribed. Call your doctor if you think you are having a problem with your medicine. · Your doctor may recommend over-the-counter medicine. For mild or occasional indigestion, antacids, such as Tums, Gaviscon, Mylanta, or Maalox, may help. Your doctor also may recommend over-the-counter acid reducers, such as Pepcid AC, Tagamet HB, Zantac 75, or Prilosec. Read and follow all instructions on the label. If you use these medicines often, talk with your doctor. · Change your eating habits. ? It's best to eat several small meals instead of two or three large meals. ? After you eat, wait 2 to 3 hours before you lie down. ? Chocolate, mint, and alcohol can make GERD worse. ? Spicy foods, foods that have a lot of acid (like tomatoes and oranges), and coffee can make GERD symptoms worse in some people.  If your symptoms are worse after you eat a certain food, you may want to stop eating that food to see if your symptoms

## 2020-05-18 RX ORDER — SIMVASTATIN 20 MG
20 TABLET ORAL NIGHTLY
Qty: 90 TABLET | Refills: 1 | Status: SHIPPED | OUTPATIENT
Start: 2020-05-18 | End: 2020-06-17 | Stop reason: SDUPTHER

## 2020-06-17 ENCOUNTER — TELEPHONE (OUTPATIENT)
Dept: INTERNAL MEDICINE CLINIC | Age: 85
End: 2020-06-17

## 2020-06-17 RX ORDER — ALLOPURINOL 100 MG/1
TABLET ORAL
Qty: 90 TABLET | Refills: 1 | Status: SHIPPED | OUTPATIENT
Start: 2020-06-17 | End: 2020-08-03

## 2020-06-17 RX ORDER — FLUTICASONE PROPIONATE 50 MCG
SPRAY, SUSPENSION (ML) NASAL
Qty: 32 G | Refills: 3 | Status: SHIPPED | OUTPATIENT
Start: 2020-06-17 | End: 2021-11-16 | Stop reason: SDUPTHER

## 2020-06-17 RX ORDER — LISINOPRIL AND HYDROCHLOROTHIAZIDE 20; 12.5 MG/1; MG/1
1 TABLET ORAL DAILY
Qty: 90 TABLET | Refills: 1 | Status: SHIPPED | OUTPATIENT
Start: 2020-06-17 | End: 2020-08-03

## 2020-06-17 RX ORDER — SIMVASTATIN 20 MG
20 TABLET ORAL NIGHTLY
Qty: 90 TABLET | Refills: 1 | Status: SHIPPED | OUTPATIENT
Start: 2020-06-17 | End: 2020-10-19

## 2020-06-17 NOTE — TELEPHONE ENCOUNTER
Patient is requesting refills of the following medications:     allopurinol (ZYLOPRIM) 100 MG tablet  lisinopril-hydrochlorothiazide (PRINZIDE;ZESTORETIC) 20-12.5 MG per tablet  simvastatin (ZOCOR) 20 MG tablet    Please send to [address], [address] [facility].

## 2020-08-03 RX ORDER — LISINOPRIL AND HYDROCHLOROTHIAZIDE 20; 12.5 MG/1; MG/1
1 TABLET ORAL DAILY
Qty: 90 TABLET | Refills: 1 | Status: SHIPPED | OUTPATIENT
Start: 2020-08-03 | End: 2021-02-14

## 2020-08-03 RX ORDER — ALLOPURINOL 100 MG/1
TABLET ORAL
Qty: 90 TABLET | Refills: 1 | Status: SHIPPED | OUTPATIENT
Start: 2020-08-03 | End: 2021-02-14

## 2020-08-03 NOTE — TELEPHONE ENCOUNTER
Last appointment: 2/3/2020  Next appointment: 9/3/2020  Last refill: 06/17/2020 was sent to  [facility].   Requesting refill be sent to [facility]

## 2020-09-03 ENCOUNTER — OFFICE VISIT (OUTPATIENT)
Dept: INTERNAL MEDICINE CLINIC | Age: 85
End: 2020-09-03
Payer: MEDICARE

## 2020-09-03 VITALS
OXYGEN SATURATION: 98 % | HEART RATE: 70 BPM | TEMPERATURE: 97.3 F | SYSTOLIC BLOOD PRESSURE: 138 MMHG | RESPIRATION RATE: 16 BRPM | BODY MASS INDEX: 23.02 KG/M2 | DIASTOLIC BLOOD PRESSURE: 64 MMHG | WEIGHT: 147 LBS

## 2020-09-03 PROCEDURE — 93000 ELECTROCARDIOGRAM COMPLETE: CPT | Performed by: INTERNAL MEDICINE

## 2020-09-03 PROCEDURE — 1036F TOBACCO NON-USER: CPT | Performed by: INTERNAL MEDICINE

## 2020-09-03 PROCEDURE — 1123F ACP DISCUSS/DSCN MKR DOCD: CPT | Performed by: INTERNAL MEDICINE

## 2020-09-03 PROCEDURE — G8427 DOCREV CUR MEDS BY ELIG CLIN: HCPCS | Performed by: INTERNAL MEDICINE

## 2020-09-03 PROCEDURE — 99213 OFFICE O/P EST LOW 20 MIN: CPT | Performed by: INTERNAL MEDICINE

## 2020-09-03 PROCEDURE — 4040F PNEUMOC VAC/ADMIN/RCVD: CPT | Performed by: INTERNAL MEDICINE

## 2020-09-03 PROCEDURE — G8420 CALC BMI NORM PARAMETERS: HCPCS | Performed by: INTERNAL MEDICINE

## 2020-09-03 NOTE — PROGRESS NOTES
Use Topics    Alcohol use: Yes     Alcohol/week: 1.0 standard drinks     Types: 1 Cans of beer per week     Frequency: 4 or more times a week     Drinks per session: 1 or 2     Comment: social    Drug use: No        Vitals:    09/03/20 0806   BP: 138/64   Site: Right Upper Arm   Position: Sitting   Cuff Size: Medium Adult   Pulse: 70  Comment: Regular   Resp: 16   Temp: 97.3 °F (36.3 °C)   TempSrc: Temporal   SpO2: 98%  Comment: Room Air   Weight: 147 lb (66.7 kg)     Estimated body mass index is 23.02 kg/m² as calculated from the following:    Height as of 2/3/20: 5' 7\" (1.702 m). Weight as of this encounter: 147 lb (66.7 kg). Physical Exam  Vitals signs reviewed. Constitutional:       Appearance: Normal appearance. Cardiovascular:      Rate and Rhythm: Normal rate. Rhythm irregular. Comments: Skipped 4th beat  Pulmonary:      Effort: Pulmonary effort is normal.      Breath sounds: Normal breath sounds. Abdominal:      General: Abdomen is flat. Bowel sounds are normal.   Musculoskeletal:      Right lower leg: No edema. Left lower leg: No edema. Neurological:      General: No focal deficit present. Mental Status: He is alert and oriented to person, place, and time. 138/70    EKG- Regular, Atrial rhythm? ASSESSMENT/PLAN:  1. Cardiac arrhythmia, unspecified cardiac arrhythmia type  By the time patient had his EKG the arrhythmia had resolved. Send a copy to his cardiologist to get their opinion. He has upcoming visit there is. He is asymptomatic as far as chest pain or shortness of breath. - EKG 12 Lead    2. Essential hypertension  Reasonably controlled on current meds which include lisinopril/HCTZ    3. Gastroesophageal reflux disease with esophagitis  Symptoms well controlled on Protonix  Patient did not see the gastroenterologist.  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  Chronic gout without tophus, unspecified cause, unspecified site  He prefers to remain on low-dose allopurinol. He stays on HCTZ due to history of kidney stones     5. Hyperlipidemia-stable on statin.     Return in about 4 months (around 1/3/2021). Age and gender appropriate preventive health care needs were discussed with patient and addressed as needed.

## 2020-10-16 ENCOUNTER — OFFICE VISIT (OUTPATIENT)
Dept: INTERNAL MEDICINE CLINIC | Age: 85
End: 2020-10-16
Payer: MEDICARE

## 2020-10-16 ENCOUNTER — TELEPHONE (OUTPATIENT)
Dept: INTERNAL MEDICINE CLINIC | Age: 85
End: 2020-10-16

## 2020-10-16 VITALS
TEMPERATURE: 98.9 F | WEIGHT: 147 LBS | BODY MASS INDEX: 23.02 KG/M2 | HEART RATE: 70 BPM | DIASTOLIC BLOOD PRESSURE: 74 MMHG | SYSTOLIC BLOOD PRESSURE: 124 MMHG

## 2020-10-16 PROCEDURE — 1036F TOBACCO NON-USER: CPT | Performed by: INTERNAL MEDICINE

## 2020-10-16 PROCEDURE — G8484 FLU IMMUNIZE NO ADMIN: HCPCS | Performed by: INTERNAL MEDICINE

## 2020-10-16 PROCEDURE — 1123F ACP DISCUSS/DSCN MKR DOCD: CPT | Performed by: INTERNAL MEDICINE

## 2020-10-16 PROCEDURE — G8427 DOCREV CUR MEDS BY ELIG CLIN: HCPCS | Performed by: INTERNAL MEDICINE

## 2020-10-16 PROCEDURE — 4040F PNEUMOC VAC/ADMIN/RCVD: CPT | Performed by: INTERNAL MEDICINE

## 2020-10-16 PROCEDURE — 4130F TOPICAL PREP RX AOE: CPT | Performed by: INTERNAL MEDICINE

## 2020-10-16 PROCEDURE — G8420 CALC BMI NORM PARAMETERS: HCPCS | Performed by: INTERNAL MEDICINE

## 2020-10-16 PROCEDURE — 99213 OFFICE O/P EST LOW 20 MIN: CPT | Performed by: INTERNAL MEDICINE

## 2020-10-16 RX ORDER — CIPROFLOXACIN AND DEXAMETHASONE 3; 1 MG/ML; MG/ML
4 SUSPENSION/ DROPS AURICULAR (OTIC) 2 TIMES DAILY
Qty: 1 BOTTLE | Refills: 0 | Status: SHIPPED | OUTPATIENT
Start: 2020-10-16 | End: 2020-12-18

## 2020-10-16 NOTE — PATIENT INSTRUCTIONS
Patient Education        Swimmer's Ear: Care Instructions  Your Care Instructions     Swimmer's ear (otitis externa) is inflammation or infection of the ear canal. This is the passage that leads from the outer ear to the eardrum. Any water, sand, or other debris that gets into the ear canal and stays there can cause swimmer's ear. Putting cotton swabs or other items in the ear to clean it can also cause this problem. Swimmer's ear can be very painful. But you can treat the pain and infection with medicines. You should feel better in a few days. Follow-up care is a key part of your treatment and safety. Be sure to make and go to all appointments, and call your doctor if you are having problems. It's also a good idea to know your test results and keep a list of the medicines you take. How can you care for yourself at home? Cleaning and care  · Use antibiotic drops as your doctor directs. · Do not insert ear drops (other than the antibiotic ear drops) or anything else into the ear unless your doctor has told you to. · Avoid getting water in the ear until the problem clears up. Use cotton lightly coated with petroleum jelly as an earplug. Do not use plastic earplugs. · Use a hair dryer set on low to carefully dry the ear after you shower. · To ease ear pain, hold a warm washcloth against your ear. · Take pain medicines exactly as directed. ? If the doctor gave you a prescription medicine for pain, take it as prescribed. ? If you are not taking a prescription pain medicine, ask your doctor if you can take an over-the-counter medicine. Inserting ear drops  · Warm the drops to body temperature by rolling the container in your hands. Or you can place it in a cup of warm water for a few minutes. · Lie down, with your ear facing up. · Place drops inside the ear. Follow your doctor's instructions (or the directions on the label) for how many drops to use.  Gently wiggle the outer ear or pull the ear up and back

## 2020-10-16 NOTE — TELEPHONE ENCOUNTER
----- Message from [facility] sent at 10/16/2020  9:47 AM EDT -----  Subject: Appointment Request    Reason for Call: Urgent (Patient Request) No Script    QUESTIONS  Type of Appointment? Established Patient  Reason for appointment request? No appointments available during search  Additional Information for Provider? Pt went to Hearing [facility] and the doctor   suggested he have his ear checked for infection. Needs in person appt. Pt   went to [facility].   ---------------------------------------------------------------------------  --------------  CALL BACK INFO  What is the best way for the office to contact you? OK to leave message on   voicemail  Preferred Call Back Phone Number? [phone]  ---------------------------------------------------------------------------  --------------  SCRIPT ANSWERS  Relationship to Patient? Self  Appointment reason? Symptomatic  Select script based on patient symptoms? Adult No Script  (Is the patient requesting to see the provider for a procedure?)? No  (Is the patient requesting to see the provider urgently  today or   tomorrow. )? Yes  Have you been diagnosed with   tested for   or told that you are suspected of having COVID-19 (Coronavirus)? No  Have you had a fever or taken medication to treat a fever within the past   3 days? No  Have you had a cough   shortness of breath or flu-like symptoms within the past 3 days? No  Do you currently have flu-like symptoms including fever or chills   cough   shortness of breath   or difficulty breathing   or new loss of taste or smell? No  (Service Expert  click yes below to proceed with [employer] As Usual   Scheduling)?  Yes

## 2020-10-16 NOTE — TELEPHONE ENCOUNTER
Patient needing a Same day appointment. Only available is a 15 min with Geronimo Valdes @ 2:30pm.  Ok to schedule?

## 2020-10-19 RX ORDER — SIMVASTATIN 20 MG
20 TABLET ORAL NIGHTLY
Qty: 90 TABLET | Refills: 3 | Status: SHIPPED | OUTPATIENT
Start: 2020-10-19 | End: 2021-11-04

## 2020-10-22 RX ORDER — PANTOPRAZOLE SODIUM 40 MG/1
TABLET, DELAYED RELEASE ORAL
Qty: 90 TABLET | Refills: 3 | Status: SHIPPED | OUTPATIENT
Start: 2020-10-22 | End: 2021-11-04

## 2020-10-23 ENCOUNTER — OFFICE VISIT (OUTPATIENT)
Dept: INTERNAL MEDICINE CLINIC | Age: 85
End: 2020-10-23
Payer: MEDICARE

## 2020-10-23 VITALS
WEIGHT: 146 LBS | DIASTOLIC BLOOD PRESSURE: 76 MMHG | OXYGEN SATURATION: 98 % | BODY MASS INDEX: 22.87 KG/M2 | TEMPERATURE: 97.1 F | HEART RATE: 76 BPM | SYSTOLIC BLOOD PRESSURE: 128 MMHG

## 2020-10-23 PROCEDURE — 99212 OFFICE O/P EST SF 10 MIN: CPT | Performed by: INTERNAL MEDICINE

## 2020-10-23 PROCEDURE — 4130F TOPICAL PREP RX AOE: CPT | Performed by: INTERNAL MEDICINE

## 2020-10-23 PROCEDURE — 1036F TOBACCO NON-USER: CPT | Performed by: INTERNAL MEDICINE

## 2020-10-23 PROCEDURE — 4040F PNEUMOC VAC/ADMIN/RCVD: CPT | Performed by: INTERNAL MEDICINE

## 2020-10-23 PROCEDURE — G8420 CALC BMI NORM PARAMETERS: HCPCS | Performed by: INTERNAL MEDICINE

## 2020-10-23 PROCEDURE — 1123F ACP DISCUSS/DSCN MKR DOCD: CPT | Performed by: INTERNAL MEDICINE

## 2020-10-23 PROCEDURE — G8484 FLU IMMUNIZE NO ADMIN: HCPCS | Performed by: INTERNAL MEDICINE

## 2020-10-23 PROCEDURE — G8427 DOCREV CUR MEDS BY ELIG CLIN: HCPCS | Performed by: INTERNAL MEDICINE

## 2020-10-23 RX ORDER — NEOMYCIN SULFATE, POLYMYXIN B SULFATE, HYDROCORTISONE 3.5; 10000; 1 MG/ML; [USP'U]/ML; MG/ML
4 SOLUTION/ DROPS AURICULAR (OTIC) 3 TIMES DAILY
COMMUNITY
End: 2020-12-18 | Stop reason: ALTCHOICE

## 2020-10-23 NOTE — Clinical Note
Ear canal looks better. Just some moistness from the eardrop. When he comes back, take a look in the left ear.

## 2020-10-23 NOTE — PROGRESS NOTES
[facility]) Physicians  Internal Medicine  Patient Encounter  Marilin Herrera D.O., Lety Diaz        Chief Complaint   Patient presents with    Follow-up     One week follow up left ear. HPI: 81 y.o. male seen today for short interval follow-up regarding his left ear. He had presented on 10/16/2020 after his audiologist indicated that he had \"white flakes\" in both ears. The patient really had no discomfort except when the audiologist attempted to remove these \"white flakes. \"  The patient admitted that he does use Q-tips in the ear canals. On my examination, it was thought that the patient had some whitish fluffy debris in the left ear canal.  This may have been pieces of Q-tip. The tympanic membrane was normal.  The floor of the canal demonstrated what appeared to be a hematoma from trauma. He was treated empirically with antibiotics/hydrocortisone eardrops (neomycin/polymyxin B/hydrocortisone drops). These were more affordable than the Ciprodex originally prescribed. The patient was advised not to use Q-tips in the external auditory canal.    Patient offers no new complaints. He has been fearful of using Q-tips again which is a good thing. He denies any pain or change in hearing.       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, 1997    Chronic gout without tophus 9/9/2016    Erectile dysfunction     Essential hypertension     Gastroesophageal reflux disease with esophagitis 7/6/2016    Hearing loss     has aids    Hyperlipidemia     Kidney stones 1980    Medullary sponge kidney 1980    Peptic ulcer 11/1996    located at the [address] [place] [address] & [facility] junction, stricture    Right bundle branch block 07/14/2015    [facility]    Vitamin D deficiency 1/17/2013         MEDICATIONS:  Medication Sig   pantoprazole (PROTONIX) 40 MG tablet TAKE 1 recognition software. Occasionally, words are mistranscribed and despite editing, the text may contain inaccuracies due to incorrect word recognition.   If further clarification is needed please contact the office at [phone]       Electronically signed    Ely Patel D.O.

## 2020-12-10 ENCOUNTER — TELEPHONE (OUTPATIENT)
Dept: INTERNAL MEDICINE CLINIC | Age: 85
End: 2020-12-10

## 2020-12-10 NOTE — TELEPHONE ENCOUNTER
Patient saw ear doctor this morning/Dr. Ramos Torrez who said he has white crustaceans in right ear. He was told to call his PCP and let them know. Any questions/concerns please call patient.

## 2020-12-10 NOTE — TELEPHONE ENCOUNTER
Please get note from the ear doctor.  Not sure if that is correct name of doctor or the correct diagnosis

## 2020-12-18 ENCOUNTER — TELEPHONE (OUTPATIENT)
Dept: INTERNAL MEDICINE CLINIC | Age: 85
End: 2020-12-18

## 2021-01-05 ENCOUNTER — OFFICE VISIT (OUTPATIENT)
Dept: ENT CLINIC | Age: 86
End: 2021-01-05
Payer: MEDICARE

## 2021-01-05 VITALS
HEART RATE: 102 BPM | WEIGHT: 147.4 LBS | DIASTOLIC BLOOD PRESSURE: 77 MMHG | SYSTOLIC BLOOD PRESSURE: 139 MMHG | HEIGHT: 65 IN | BODY MASS INDEX: 24.56 KG/M2 | TEMPERATURE: 97 F

## 2021-01-05 DIAGNOSIS — H60.62 CHRONIC OTITIS EXTERNA OF LEFT EAR, UNSPECIFIED TYPE: Primary | ICD-10-CM

## 2021-01-05 PROCEDURE — 1123F ACP DISCUSS/DSCN MKR DOCD: CPT | Performed by: OTOLARYNGOLOGY

## 2021-01-05 PROCEDURE — G8484 FLU IMMUNIZE NO ADMIN: HCPCS | Performed by: OTOLARYNGOLOGY

## 2021-01-05 PROCEDURE — 1036F TOBACCO NON-USER: CPT | Performed by: OTOLARYNGOLOGY

## 2021-01-05 PROCEDURE — 99203 OFFICE O/P NEW LOW 30 MIN: CPT | Performed by: OTOLARYNGOLOGY

## 2021-01-05 PROCEDURE — 4040F PNEUMOC VAC/ADMIN/RCVD: CPT | Performed by: OTOLARYNGOLOGY

## 2021-01-05 PROCEDURE — G8427 DOCREV CUR MEDS BY ELIG CLIN: HCPCS | Performed by: OTOLARYNGOLOGY

## 2021-01-05 PROCEDURE — G8420 CALC BMI NORM PARAMETERS: HCPCS | Performed by: OTOLARYNGOLOGY

## 2021-01-05 PROCEDURE — 92504 EAR MICROSCOPY EXAMINATION: CPT | Performed by: OTOLARYNGOLOGY
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
## 2021-01-07 ENCOUNTER — OFFICE VISIT (OUTPATIENT)
Dept: INTERNAL MEDICINE CLINIC | Age: 86
End: 2021-01-07
Payer: MEDICARE

## 2021-01-07 VITALS
TEMPERATURE: 97.6 F | WEIGHT: 147 LBS | OXYGEN SATURATION: 96 % | HEIGHT: 67 IN | HEART RATE: 88 BPM | RESPIRATION RATE: 16 BRPM | BODY MASS INDEX: 23.07 KG/M2 | SYSTOLIC BLOOD PRESSURE: 138 MMHG | DIASTOLIC BLOOD PRESSURE: 70 MMHG

## 2021-01-07 DIAGNOSIS — I10 ESSENTIAL HYPERTENSION: ICD-10-CM

## 2021-01-07 DIAGNOSIS — Z00.00 ROUTINE GENERAL MEDICAL EXAMINATION AT A HEALTH CARE FACILITY: Primary | ICD-10-CM

## 2021-01-07 DIAGNOSIS — E78.49 OTHER HYPERLIPIDEMIA: ICD-10-CM

## 2021-01-07 PROCEDURE — G8420 CALC BMI NORM PARAMETERS: HCPCS | Performed by: INTERNAL MEDICINE

## 2021-01-07 PROCEDURE — 4040F PNEUMOC VAC/ADMIN/RCVD: CPT | Performed by: INTERNAL MEDICINE

## 2021-01-07 PROCEDURE — 1036F TOBACCO NON-USER: CPT | Performed by: INTERNAL MEDICINE

## 2021-01-07 PROCEDURE — G0439 PPPS, SUBSEQ VISIT: HCPCS | Performed by: INTERNAL MEDICINE

## 2021-01-07 PROCEDURE — 99214 OFFICE O/P EST MOD 30 MIN: CPT | Performed by: INTERNAL MEDICINE

## 2021-01-07 PROCEDURE — 1123F ACP DISCUSS/DSCN MKR DOCD: CPT | Performed by: INTERNAL MEDICINE

## 2021-01-07 PROCEDURE — G8484 FLU IMMUNIZE NO ADMIN: HCPCS | Performed by: INTERNAL MEDICINE

## 2021-01-07 PROCEDURE — G8427 DOCREV CUR MEDS BY ELIG CLIN: HCPCS | Performed by: INTERNAL MEDICINE

## 2021-01-07 ASSESSMENT — LIFESTYLE VARIABLES
HAVE YOU OR SOMEONE ELSE BEEN INJURED AS A RESULT OF YOUR DRINKING: 0
AUDIT TOTAL SCORE: 4
HOW OFTEN DURING THE LAST YEAR HAVE YOU FOUND THAT YOU WERE NOT ABLE TO STOP DRINKING ONCE YOU HAD STARTED: 0
HAS A RELATIVE, FRIEND, DOCTOR, OR ANOTHER HEALTH PROFESSIONAL EXPRESSED CONCERN ABOUT YOUR DRINKING OR SUGGESTED YOU CUT DOWN: 0
HOW MANY STANDARD DRINKS CONTAINING ALCOHOL DO YOU HAVE ON A TYPICAL DAY: 0
HOW OFTEN DO YOU HAVE SIX OR MORE DRINKS ON ONE OCCASION: 0

## 2021-01-07 ASSESSMENT — ENCOUNTER SYMPTOMS
COUGH: 0
RHINORRHEA: 0
SORE THROAT: 0
ABDOMINAL PAIN: 0
NAUSEA: 0
SHORTNESS OF BREATH: 0
TROUBLE SWALLOWING: 0
DIARRHEA: 0

## 2021-01-07 ASSESSMENT — PATIENT HEALTH QUESTIONNAIRE - PHQ9
SUM OF ALL RESPONSES TO PHQ QUESTIONS 1-9: 0
SUM OF ALL RESPONSES TO PHQ9 QUESTIONS 1 & 2: 0
2. FEELING DOWN, DEPRESSED OR HOPELESS: 0
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
## 2021-01-07 NOTE — PATIENT INSTRUCTIONS
Get labs a few weeks after first Covid shot  ---  Personalized Preventive Plan for Anna Thomson - 1/7/2021  Medicare offers a range of preventive health benefits. Some of the tests and screenings are paid in full while other may be subject to a deductible, co-insurance, and/or copay. Some of these benefits include a comprehensive review of your medical history including lifestyle, illnesses that may run in your family, and various assessments and screenings as appropriate. After reviewing your medical record and screening and assessments performed today your provider may have ordered immunizations, labs, imaging, and/or referrals for you. A list of these orders (if applicable) as well as your Preventive Care list are included within your After Visit Summary for your review. Other Preventive Recommendations:    · A preventive eye exam performed by an eye specialist is recommended every 1-2 years to screen for glaucoma; cataracts, macular degeneration, and other eye disorders. · A preventive dental visit is recommended every 6 months. · Try to get at least 150 minutes of exercise per week or 10,000 steps per day on a pedometer . · Order or download the FREE \"Exercise & Physical Activity: Your Everyday Guide\" from [employer]. Call 9-335.658.5875 or search [employer] online. · You need 4202-9623 mg of calcium and 5984-2501 IU of vitamin D per day. It is possible to meet your calcium requirement with diet alone, but a vitamin D supplement is usually necessary to meet this goal.  · When exposed to the sun, use a sunscreen that protects against both UVA and UVB radiation with an SPF of 30 or greater. Reapply every 2 to 3 hours or after sweating, drying off with a towel, or swimming. · Always wear a seat belt when traveling in a car. Always wear a helmet when riding a bicycle or motorcycle.

## 2021-01-08 DIAGNOSIS — E78.49 OTHER HYPERLIPIDEMIA: ICD-10-CM

## 2021-01-08 LAB
A/G RATIO: 2.3 (ref 1.1–2.2)
ALBUMIN SERPL-MCNC: 4.4 G/DL (ref 3.4–5)
ALP BLD-CCNC: 65 U/L (ref 40–129)
ALT SERPL-CCNC: 25 U/L (ref 10–40)
ANION GAP SERPL CALCULATED.3IONS-SCNC: 10 MMOL/L (ref 3–16)
AST SERPL-CCNC: 21 U/L (ref 15–37)
BILIRUB SERPL-MCNC: 0.5 MG/DL (ref 0–1)
BUN BLDV-MCNC: 23 MG/DL (ref 7–20)
CALCIUM SERPL-MCNC: 9.7 MG/DL (ref 8.3–10.6)
CHLORIDE BLD-SCNC: 99 MMOL/L (ref 99–110)
CHOLESTEROL, TOTAL: 189 MG/DL (ref 0–199)
CO2: 29 MMOL/L (ref 21–32)
CREAT SERPL-MCNC: 1 MG/DL (ref 0.8–1.3)
GFR AFRICAN AMERICAN: >60
GFR NON-AFRICAN AMERICAN: >60
GLOBULIN: 1.9 G/DL
GLUCOSE FASTING: 110 MG/DL (ref 70–99)
HDLC SERPL-MCNC: 50 MG/DL (ref 40–60)
LDL CHOLESTEROL CALCULATED: 96 MG/DL
POTASSIUM SERPL-SCNC: 4.6 MMOL/L (ref 3.5–5.1)
SODIUM BLD-SCNC: 138 MMOL/L (ref 136–145)
TOTAL PROTEIN: 6.3 G/DL (ref 6.4–8.2)
TRIGL SERPL-MCNC: 217 MG/DL (ref 0–150)
VLDLC SERPL CALC-MCNC: 43 MG/DL

## 2021-01-11 ENCOUNTER — OFFICE VISIT (OUTPATIENT)
Dept: ENT CLINIC | Age: 86
End: 2021-01-11
Payer: MEDICARE

## 2021-01-11 VITALS
HEART RATE: 74 BPM | WEIGHT: 148.4 LBS | BODY MASS INDEX: 22.49 KG/M2 | TEMPERATURE: 96.8 F | DIASTOLIC BLOOD PRESSURE: 71 MMHG | SYSTOLIC BLOOD PRESSURE: 137 MMHG | HEIGHT: 68 IN

## 2021-01-11 DIAGNOSIS — H60.62 CHRONIC OTITIS EXTERNA OF LEFT EAR, UNSPECIFIED TYPE: Primary | ICD-10-CM

## 2021-01-11 PROCEDURE — G8420 CALC BMI NORM PARAMETERS: HCPCS | Performed by: OTOLARYNGOLOGY

## 2021-01-11 PROCEDURE — 1036F TOBACCO NON-USER: CPT | Performed by: OTOLARYNGOLOGY

## 2021-01-11 PROCEDURE — G8484 FLU IMMUNIZE NO ADMIN: HCPCS | Performed by: OTOLARYNGOLOGY

## 2021-01-11 PROCEDURE — 1123F ACP DISCUSS/DSCN MKR DOCD: CPT | Performed by: OTOLARYNGOLOGY

## 2021-01-11 PROCEDURE — G8427 DOCREV CUR MEDS BY ELIG CLIN: HCPCS | Performed by: OTOLARYNGOLOGY

## 2021-01-11 PROCEDURE — 4040F PNEUMOC VAC/ADMIN/RCVD: CPT | Performed by: OTOLARYNGOLOGY

## 2021-01-11 PROCEDURE — 99212 OFFICE O/P EST SF 10 MIN: CPT | Performed by: OTOLARYNGOLOGY

## 2021-01-11 PROCEDURE — 92504 EAR MICROSCOPY EXAMINATION: CPT | Performed by: OTOLARYNGOLOGY
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
## 2021-02-08 ENCOUNTER — OFFICE VISIT (OUTPATIENT)
Dept: ENT CLINIC | Age: 86
End: 2021-02-08
Payer: MEDICARE

## 2021-02-08 VITALS
DIASTOLIC BLOOD PRESSURE: 72 MMHG | BODY MASS INDEX: 22.49 KG/M2 | HEART RATE: 77 BPM | TEMPERATURE: 97 F | SYSTOLIC BLOOD PRESSURE: 158 MMHG | HEIGHT: 68 IN | WEIGHT: 148.4 LBS

## 2021-02-08 DIAGNOSIS — H60.62 CHRONIC OTITIS EXTERNA OF LEFT EAR, UNSPECIFIED TYPE: Primary | ICD-10-CM

## 2021-02-08 PROCEDURE — 1123F ACP DISCUSS/DSCN MKR DOCD: CPT | Performed by: OTOLARYNGOLOGY

## 2021-02-08 PROCEDURE — 4040F PNEUMOC VAC/ADMIN/RCVD: CPT | Performed by: OTOLARYNGOLOGY

## 2021-02-08 PROCEDURE — G8427 DOCREV CUR MEDS BY ELIG CLIN: HCPCS | Performed by: OTOLARYNGOLOGY

## 2021-02-08 PROCEDURE — 1036F TOBACCO NON-USER: CPT | Performed by: OTOLARYNGOLOGY

## 2021-02-08 PROCEDURE — G8484 FLU IMMUNIZE NO ADMIN: HCPCS | Performed by: OTOLARYNGOLOGY

## 2021-02-08 PROCEDURE — 99212 OFFICE O/P EST SF 10 MIN: CPT | Performed by: OTOLARYNGOLOGY

## 2021-02-08 PROCEDURE — 92504 EAR MICROSCOPY EXAMINATION: CPT | Performed by: OTOLARYNGOLOGY

## 2021-02-08 PROCEDURE — G8420 CALC BMI NORM PARAMETERS: HCPCS | Performed by: OTOLARYNGOLOGY
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
## 2021-02-12 DIAGNOSIS — I10 ESSENTIAL HYPERTENSION: Chronic | ICD-10-CM

## 2021-02-12 DIAGNOSIS — M1A.9XX0 CHRONIC GOUT WITHOUT TOPHUS, UNSPECIFIED CAUSE, UNSPECIFIED SITE: Chronic | ICD-10-CM

## 2021-02-15 RX ORDER — ALLOPURINOL 100 MG/1
TABLET ORAL
Qty: 90 TABLET | Refills: 1 | Status: SHIPPED | OUTPATIENT
Start: 2021-02-15 | End: 2021-09-03

## 2021-02-15 RX ORDER — LISINOPRIL AND HYDROCHLOROTHIAZIDE 20; 12.5 MG/1; MG/1
1 TABLET ORAL DAILY
Qty: 90 TABLET | Refills: 1 | Status: SHIPPED | OUTPATIENT
Start: 2021-02-15 | End: 2021-09-03

## 2021-03-02 ENCOUNTER — IMMUNIZATION (OUTPATIENT)
Dept: PRIMARY CARE CLINIC | Age: 86
End: 2021-03-02
Payer: MEDICARE

## 2021-03-02 PROCEDURE — 91301 COVID-19, MODERNA VACCINE 100MCG/0.5ML DOSE: CPT | Performed by: FAMILY MEDICINE

## 2021-03-02 PROCEDURE — 0011A COVID-19, MODERNA VACCINE 100MCG/0.5ML DOSE: CPT | Performed by: FAMILY MEDICINE

## 2021-03-30 ENCOUNTER — IMMUNIZATION (OUTPATIENT)
Dept: PRIMARY CARE CLINIC | Age: 86
End: 2021-03-30
Payer: MEDICARE

## 2021-03-30 PROCEDURE — 0012A COVID-19, MODERNA VACCINE 100MCG/0.5ML DOSE: CPT | Performed by: FAMILY MEDICINE

## 2021-03-30 PROCEDURE — 91301 COVID-19, MODERNA VACCINE 100MCG/0.5ML DOSE: CPT | Performed by: FAMILY MEDICINE

## 2021-05-03 ENCOUNTER — OFFICE VISIT (OUTPATIENT)
Dept: ENT CLINIC | Age: 86
End: 2021-05-03
Payer: MEDICARE

## 2021-05-03 VITALS
HEIGHT: 68 IN | WEIGHT: 146 LBS | TEMPERATURE: 97.9 F | HEART RATE: 73 BPM | DIASTOLIC BLOOD PRESSURE: 65 MMHG | SYSTOLIC BLOOD PRESSURE: 126 MMHG | BODY MASS INDEX: 22.13 KG/M2

## 2021-05-03 DIAGNOSIS — H60.62 CHRONIC OTITIS EXTERNA OF LEFT EAR, UNSPECIFIED TYPE: Primary | ICD-10-CM

## 2021-05-03 PROCEDURE — 1123F ACP DISCUSS/DSCN MKR DOCD: CPT | Performed by: OTOLARYNGOLOGY

## 2021-05-03 PROCEDURE — 4040F PNEUMOC VAC/ADMIN/RCVD: CPT | Performed by: OTOLARYNGOLOGY

## 2021-05-03 PROCEDURE — 1036F TOBACCO NON-USER: CPT | Performed by: OTOLARYNGOLOGY

## 2021-05-03 PROCEDURE — G8427 DOCREV CUR MEDS BY ELIG CLIN: HCPCS | Performed by: OTOLARYNGOLOGY

## 2021-05-03 PROCEDURE — 92504 EAR MICROSCOPY EXAMINATION: CPT | Performed by: OTOLARYNGOLOGY

## 2021-05-03 PROCEDURE — G8420 CALC BMI NORM PARAMETERS: HCPCS | Performed by: OTOLARYNGOLOGY
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
## 2021-06-14 ENCOUNTER — TELEPHONE (OUTPATIENT)
Dept: INTERNAL MEDICINE CLINIC | Age: 86
End: 2021-06-14

## 2021-06-14 NOTE — TELEPHONE ENCOUNTER
Patient advised wife scheduled for outpatient testing at [facility] on Friday, 06/18/2021, arrive at 7:30 am at main entrance of hospital.  See previous phone note.

## 2021-07-07 ENCOUNTER — OFFICE VISIT (OUTPATIENT)
Dept: INTERNAL MEDICINE CLINIC | Age: 86
End: 2021-07-07
Payer: MEDICARE

## 2021-07-07 VITALS
BODY MASS INDEX: 22.05 KG/M2 | HEART RATE: 72 BPM | WEIGHT: 145 LBS | TEMPERATURE: 98.5 F | RESPIRATION RATE: 16 BRPM | DIASTOLIC BLOOD PRESSURE: 68 MMHG | OXYGEN SATURATION: 98 % | SYSTOLIC BLOOD PRESSURE: 130 MMHG

## 2021-07-07 DIAGNOSIS — M1A.9XX0 CHRONIC GOUT WITHOUT TOPHUS, UNSPECIFIED CAUSE, UNSPECIFIED SITE: ICD-10-CM

## 2021-07-07 DIAGNOSIS — R73.03 PRE-DIABETES: ICD-10-CM

## 2021-07-07 DIAGNOSIS — I10 ESSENTIAL HYPERTENSION: Primary | ICD-10-CM

## 2021-07-07 DIAGNOSIS — K21.00 GASTROESOPHAGEAL REFLUX DISEASE WITH ESOPHAGITIS, UNSPECIFIED WHETHER HEMORRHAGE: ICD-10-CM

## 2021-07-07 DIAGNOSIS — E78.49 OTHER HYPERLIPIDEMIA: ICD-10-CM

## 2021-07-07 PROCEDURE — G8420 CALC BMI NORM PARAMETERS: HCPCS | Performed by: INTERNAL MEDICINE

## 2021-07-07 PROCEDURE — 1036F TOBACCO NON-USER: CPT | Performed by: INTERNAL MEDICINE

## 2021-07-07 PROCEDURE — 99214 OFFICE O/P EST MOD 30 MIN: CPT | Performed by: INTERNAL MEDICINE

## 2021-07-07 PROCEDURE — 4040F PNEUMOC VAC/ADMIN/RCVD: CPT | Performed by: INTERNAL MEDICINE

## 2021-07-07 PROCEDURE — G8427 DOCREV CUR MEDS BY ELIG CLIN: HCPCS | Performed by: INTERNAL MEDICINE

## 2021-07-07 PROCEDURE — 1123F ACP DISCUSS/DSCN MKR DOCD: CPT | Performed by: INTERNAL MEDICINE

## 2021-07-07 SDOH — ECONOMIC STABILITY: FOOD INSECURITY: WITHIN THE PAST 12 MONTHS, YOU WORRIED THAT YOUR FOOD WOULD RUN OUT BEFORE YOU GOT MONEY TO BUY MORE.: NEVER TRUE

## 2021-07-07 SDOH — ECONOMIC STABILITY: FOOD INSECURITY: WITHIN THE PAST 12 MONTHS, THE FOOD YOU BOUGHT JUST DIDN'T LAST AND YOU DIDN'T HAVE MONEY TO GET MORE.: NEVER TRUE

## 2021-07-07 ASSESSMENT — SOCIAL DETERMINANTS OF HEALTH (SDOH): HOW HARD IS IT FOR YOU TO PAY FOR THE VERY BASICS LIKE FOOD, HOUSING, MEDICAL CARE, AND HEATING?: NOT HARD AT ALL

## 2021-07-07 NOTE — PROGRESS NOTES
Keo Keith (:  1935) is a 81 y.o. male, here for evaluation of the following chief complaint(s):    Follow-up      ASSESSMENT/PLAN:  1. Essential hypertension  Well-controlled on lisinopril hydrochlorothiazide  2. Other hyperlipidemia  Stable on simvastatin. Continues to see Cardiology, [facility].  -     Lipid Panel; Future  -     Comprehensive Metabolic Panel, Fasting; Future  3. Gastroesophageal reflux disease with esophagitis, unspecified whether hemorrhage   Symptoms controlled on pantoprazole. Had EGD   -     Magnesium; Future  4. Pre-diabetes  -     Hemoglobin A1C; Future  5. Chronic gout without tophus, unspecified cause, unspecified site  Stable on allopurinol. He is on hydrochlorothiazide due to history of kidney stones    Return in about 6 months (around 2022) for awv. SUBJECTIVE/OBJECTIVE:  HPI   Patient is here for routine visit. Overall he feels well and is without new complaints. He stays busy taking care of his wife who has dementia and doing hobbies around the house. He is not feeling overwhelmed in his caregiving responsibilities. He has largely given up golf. Review of Systems   Constitutional: Negative for fatigue and fever. HENT: Positive for hearing loss. Negative for rhinorrhea, sore throat and trouble swallowing. Eyes: Negative for visual disturbance. Respiratory: Negative for cough and shortness of breath. Cardiovascular: Negative for chest pain and palpitations. Gastrointestinal: Negative for abdominal pain, diarrhea and nausea. Genitourinary: Negative for decreased urine volume, dysuria and frequency. Musculoskeletal: Negative for arthralgias and myalgias. Skin: Negative for rash. Allergic/Immunologic: Negative for immunocompromised state. Neurological: Negative for dizziness, numbness and headaches. Hematological: Does not bruise/bleed easily. Psychiatric/Behavioral: Negative for dysphoric mood and sleep disturbance.  The patient is not nervous/anxious. Past Medical History:   Diagnosis Date    Abnormal cardiovascular stress test 04/06/2016 April 1, 2016 [facility] --- 1.4 mm ST segment depression V4-V6    Allergic rhinitis     Zamarripa's esophagus January 9, 1997    [facility])     Chronic gout without tophus 9/9/2016    Erectile dysfunction     Essential hypertension     Gastroesophageal reflux disease with esophagitis 7/6/2016    GERD (gastroesophageal reflux disease)     Hearing loss     has aids    Hyperlipidemia     Kidney stones 1980    Medullary sponge kidney 1980    Peptic ulcer 11/1996    located at the [facility] junction, stricture    Rash     Right bundle branch block 07/14/2015    [place]    Vitamin D deficiency 1/17/2013       Current Outpatient Medications   Medication Sig Dispense Refill    lisinopril-hydroCHLOROthiazide (PRINZIDE;ZESTORETIC) 20-12.5 MG per tablet TAKE 1 TABLET BY MOUTH  DAILY 90 tablet 1    allopurinol (ZYLOPRIM) 100 MG tablet TAKE 1 TABLET BY MOUTH  DAILY 90 tablet 1    pantoprazole (PROTONIX) 40 MG tablet TAKE 1 TABLET BY MOUTH  DAILY AS NEEDED FOR GERD 90 tablet 3    simvastatin (ZOCOR) 20 MG tablet TAKE 1 TABLET BY MOUTH  NIGHTLY 90 tablet 3    fluticasone (FLONASE) 50 MCG/ACT nasal spray USE 1 SPRAY NASALLY EVERY  DAY 32 g 3    Multiple Vitamins-Minerals (OCUVITE) TABS oral tablet Take 1 tablet by mouth daily 90 tablet 3    Cholecalciferol (VITAMIN D) 2000 UNITS CAPS capsule Take 1 capsule by mouth daily. 90 capsule 3    Omega-3 Fatty Acids (FISH OIL) 1000 MG CAPS Take 1 capsule by mouth daily. 30 capsule 12    aspirin EC 81 MG EC tablet Take 1 tablet by mouth daily. With food. 30 tablet 12    hydrocortisone 1 % cream Apply  topically. apply to the affected area twice a day until healed 30 g 12     No current facility-administered medications for this visit. Physical Exam  Vitals and nursing note reviewed.    Constitutional:       General: He is not in acute distress. Appearance: He is well-developed. HENT:      Head: Normocephalic and atraumatic. Right Ear: External ear normal.      Left Ear: External ear normal.      Nose: Nose normal.   Eyes:      General: No scleral icterus. Pupils: Pupils are equal, round, and reactive to light. Neck:      Thyroid: No thyromegaly. Cardiovascular:      Rate and Rhythm: Normal rate and regular rhythm. Heart sounds: No murmur heard. Pulmonary:      Effort: No respiratory distress. Breath sounds: Normal breath sounds. No wheezing or rales. Abdominal:      General: Bowel sounds are normal. There is distension (mild,likely diastisis recti). Palpations: Abdomen is soft. Tenderness: There is no abdominal tenderness. Musculoskeletal:         General: No deformity. Normal range of motion. Cervical back: Normal range of motion. Right lower leg: No edema. Left lower leg: No edema. Lymphadenopathy:      Cervical: No cervical adenopathy. Skin:     General: Skin is warm and dry. Neurological:      Mental Status: He is alert and oriented to person, place, and time. Cranial Nerves: No cranial nerve deficit. Sensory: No sensory deficit. Coordination: Coordination normal.   Psychiatric:         Thought Content: Thought content normal.        130/70            This note was generated completely or in part utilizing Dragon dictation speech recognition software. Occasionally, words are mistranscribed and despite editing, the text may contain inaccuracies due to incorrect word recognition. If further clarification is needed please contact the office at [phone]          An electronic signature was used to authenticate this note.     --Lelia Jack MD

## 2021-07-11 ASSESSMENT — ENCOUNTER SYMPTOMS
RHINORRHEA: 0
NAUSEA: 0
SORE THROAT: 0
DIARRHEA: 0
TROUBLE SWALLOWING: 0
SHORTNESS OF BREATH: 0
ABDOMINAL PAIN: 0
COUGH: 0

## 2021-09-03 DIAGNOSIS — M1A.9XX0 CHRONIC GOUT WITHOUT TOPHUS, UNSPECIFIED CAUSE, UNSPECIFIED SITE: Chronic | ICD-10-CM

## 2021-09-03 DIAGNOSIS — I10 ESSENTIAL HYPERTENSION: Chronic | ICD-10-CM

## 2021-09-03 RX ORDER — ALLOPURINOL 100 MG/1
TABLET ORAL
Qty: 90 TABLET | Refills: 3 | Status: SHIPPED | OUTPATIENT
Start: 2021-09-03 | End: 2022-06-13 | Stop reason: SDUPTHER

## 2021-09-03 RX ORDER — LISINOPRIL AND HYDROCHLOROTHIAZIDE 20; 12.5 MG/1; MG/1
1 TABLET ORAL DAILY
Qty: 90 TABLET | Refills: 3 | Status: SHIPPED | OUTPATIENT
Start: 2021-09-03 | End: 2022-01-21

## 2021-09-03 NOTE — TELEPHONE ENCOUNTER
Last appointment: 7/7/2021  Next appointment: 1/10/2022  Last refill:   Prinzide 90 with 1 02/15/2021  Allopurinol 90 with 1 02/15/20

## 2021-09-13 ENCOUNTER — NURSE ONLY (OUTPATIENT)
Dept: INTERNAL MEDICINE CLINIC | Age: 86
End: 2021-09-13
Payer: MEDICARE

## 2021-09-13 DIAGNOSIS — Z23 NEED FOR INFLUENZA VACCINATION: Primary | ICD-10-CM

## 2021-09-13 PROCEDURE — 90694 VACC AIIV4 NO PRSRV 0.5ML IM: CPT | Performed by: INTERNAL MEDICINE

## 2021-09-13 PROCEDURE — G0008 ADMIN INFLUENZA VIRUS VAC: HCPCS | Performed by: INTERNAL MEDICINE

## 2021-09-16 ENCOUNTER — TELEPHONE (OUTPATIENT)
Dept: INTERNAL MEDICINE CLINIC | Age: 86
End: 2021-09-16

## 2021-09-16 NOTE — TELEPHONE ENCOUNTER
Patient's daughter-in-law, Eamon Seth is calling with concern for Patient's mental health. She states Patient has given away financial information 3 times via PC/Phone without any recall of doing so. Eamon Dorinda is wanting to know if she is able to meet with Dr. Aide Haskins to discuss further. Eamon Seth is not on HIPPA, but her , Patient's Son, Jag Means is POA.

## 2021-09-17 NOTE — TELEPHONE ENCOUNTER
Updated HIPPA has been scanned to patient chart. Permission to speak to Son and DIL has been granted.

## 2021-09-17 NOTE — TELEPHONE ENCOUNTER
Spoke to daughter in law [place] and decided we will set up a meeting for the week after next to discuss memory concerns and issues with giving information out over the phone, bill paying etc.  May do Sandoval exam, get home health care for Thurnell Riser and consider social work consult to check on other services. I left a message for Carmela Cota to let him know about this.

## 2021-09-21 NOTE — TELEPHONE ENCOUNTER
Left message with daughter Crystal Becerril at [phone] she will be helping with appointment scheduling times.

## 2021-09-28 ENCOUNTER — TELEPHONE (OUTPATIENT)
Dept: INTERNAL MEDICINE CLINIC | Age: 86
End: 2021-09-28

## 2021-09-28 NOTE — TELEPHONE ENCOUNTER
Patient's daughter David Lindo, is requesting to know if both she and her brother can be present at patient's upcoming appointment to discuss memory issues. Please advise, and contact her @ phone # provided.

## 2021-10-18 ENCOUNTER — OFFICE VISIT (OUTPATIENT)
Dept: INTERNAL MEDICINE CLINIC | Age: 86
End: 2021-10-18
Payer: MEDICARE

## 2021-10-18 VITALS
WEIGHT: 142 LBS | OXYGEN SATURATION: 97 % | SYSTOLIC BLOOD PRESSURE: 140 MMHG | DIASTOLIC BLOOD PRESSURE: 80 MMHG | BODY MASS INDEX: 21.59 KG/M2 | HEART RATE: 79 BPM

## 2021-10-18 DIAGNOSIS — G31.84 MILD COGNITIVE IMPAIRMENT: Primary | ICD-10-CM

## 2021-10-18 PROCEDURE — G8420 CALC BMI NORM PARAMETERS: HCPCS | Performed by: INTERNAL MEDICINE

## 2021-10-18 PROCEDURE — 4040F PNEUMOC VAC/ADMIN/RCVD: CPT | Performed by: INTERNAL MEDICINE

## 2021-10-18 PROCEDURE — 1036F TOBACCO NON-USER: CPT | Performed by: INTERNAL MEDICINE

## 2021-10-18 PROCEDURE — 99215 OFFICE O/P EST HI 40 MIN: CPT | Performed by: INTERNAL MEDICINE

## 2021-10-18 PROCEDURE — G8484 FLU IMMUNIZE NO ADMIN: HCPCS | Performed by: INTERNAL MEDICINE

## 2021-10-18 PROCEDURE — G8427 DOCREV CUR MEDS BY ELIG CLIN: HCPCS | Performed by: INTERNAL MEDICINE

## 2021-10-18 PROCEDURE — 1123F ACP DISCUSS/DSCN MKR DOCD: CPT | Performed by: INTERNAL MEDICINE
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
## 2021-10-18 NOTE — PATIENT INSTRUCTIONS
Avoid driving at night and avoid highways    Family meeting about the finances generally and having someone look over bills with you every 2 weeks    MOCA 25/30

## 2021-11-02 ENCOUNTER — TELEPHONE (OUTPATIENT)
Dept: INTERNAL MEDICINE CLINIC | Age: 86
End: 2021-11-02

## 2021-11-02 NOTE — TELEPHONE ENCOUNTER
Spoke to patient's daughter Robin Yates. He may have passed a kidney stone a few days ago and is feeling better now. He may have gotten lost trying to go to hospital ER last week with his wife. ---  Brenda Funez,   Please call patient's daughter [phone] about driving evaluation, and other concerns related to cognitive impairment.

## 2021-11-03 ENCOUNTER — TELEPHONE (OUTPATIENT)
Dept: PRIMARY CARE CLINIC | Age: 86
End: 2021-11-03

## 2021-11-03 NOTE — TELEPHONE ENCOUNTER
SW Contact:  Return call from daughter Cassi Tsai. Requesting resources d/t concern for patient exhibiting s/s of a dementia aeb getting lost driving to familiar places, scored 25/30 on MOCA with no scores in visual spatial and memory at 10/18/21 PCP visit. Pt lives with spouse who she reports has dementia. Daughter reports pt refuses home assistance for spouse and himself. Provided coaching with communication. Discussed caregivers are the ones that have to change communication and use tough love tactics to ensure safety and that their mother gets appropriate care. Daughter agreeable to referral to the [employer] for a free family care consultation with a nurse or  to provide 24/7 HELPLINE to coaching with communication, education, resources and care planning for the progression of the disease. Referred to [email or web address] legal help line to discuss planning for affordability of anticipated 24/7 care needs with a free eldercare . Directed her to [employer] website to review fact sheets on driving, how to get a diagnosis, communication, and denial. Offered list of loca driving assessment programs daughter declines at this time d/t pt's denial he would most likely not participate. Encouraged daughter to try to convince patient to see a specialist to \"r/o\" a dementia and to continue to reiterate [address] [address] score.

## 2021-11-04 DIAGNOSIS — K21.00 GASTROESOPHAGEAL REFLUX DISEASE WITH ESOPHAGITIS: Chronic | ICD-10-CM

## 2021-11-04 DIAGNOSIS — E78.49 OTHER HYPERLIPIDEMIA: ICD-10-CM

## 2021-11-04 RX ORDER — PANTOPRAZOLE SODIUM 40 MG/1
TABLET, DELAYED RELEASE ORAL
Qty: 90 TABLET | Refills: 3 | Status: SHIPPED | OUTPATIENT
Start: 2021-11-04 | End: 2022-06-13 | Stop reason: SDUPTHER

## 2021-11-04 RX ORDER — SIMVASTATIN 20 MG
20 TABLET ORAL NIGHTLY
Qty: 90 TABLET | Refills: 3 | Status: SHIPPED | OUTPATIENT
Start: 2021-11-04 | End: 2022-06-13 | Stop reason: SDUPTHER

## 2021-11-16 RX ORDER — FLUTICASONE PROPIONATE 50 MCG
SPRAY, SUSPENSION (ML) NASAL
Qty: 48 G | Refills: 3 | Status: SHIPPED | OUTPATIENT
Start: 2021-11-16

## 2021-12-16 ENCOUNTER — IMMUNIZATION (OUTPATIENT)
Dept: FAMILY MEDICINE CLINIC | Age: 86
End: 2021-12-16
Payer: MEDICARE

## 2021-12-16 PROCEDURE — 0004A COVID-19, PFIZER VACCINE 30MCG/0.3ML DOSE: CPT | Performed by: FAMILY MEDICINE

## 2021-12-16 PROCEDURE — 91300 COVID-19, PFIZER VACCINE 30MCG/0.3ML DOSE: CPT | Performed by: FAMILY MEDICINE
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
## 2022-01-21 ENCOUNTER — OFFICE VISIT (OUTPATIENT)
Dept: INTERNAL MEDICINE CLINIC | Age: 87
End: 2022-01-21
Payer: MEDICARE

## 2022-01-21 VITALS
DIASTOLIC BLOOD PRESSURE: 80 MMHG | WEIGHT: 144.8 LBS | HEIGHT: 68 IN | BODY MASS INDEX: 21.95 KG/M2 | SYSTOLIC BLOOD PRESSURE: 152 MMHG | RESPIRATION RATE: 14 BRPM | HEART RATE: 78 BPM | OXYGEN SATURATION: 98 %

## 2022-01-21 DIAGNOSIS — G31.84 MILD COGNITIVE IMPAIRMENT: ICD-10-CM

## 2022-01-21 DIAGNOSIS — Z00.00 ROUTINE GENERAL MEDICAL EXAMINATION AT A HEALTH CARE FACILITY: Primary | ICD-10-CM

## 2022-01-21 DIAGNOSIS — K21.00 GASTROESOPHAGEAL REFLUX DISEASE WITH ESOPHAGITIS WITHOUT HEMORRHAGE: ICD-10-CM

## 2022-01-21 DIAGNOSIS — E78.49 OTHER HYPERLIPIDEMIA: ICD-10-CM

## 2022-01-21 DIAGNOSIS — M1A.9XX0 CHRONIC GOUT WITHOUT TOPHUS, UNSPECIFIED CAUSE, UNSPECIFIED SITE: ICD-10-CM

## 2022-01-21 DIAGNOSIS — I10 ESSENTIAL HYPERTENSION: ICD-10-CM

## 2022-01-21 PROCEDURE — G8484 FLU IMMUNIZE NO ADMIN: HCPCS | Performed by: INTERNAL MEDICINE

## 2022-01-21 PROCEDURE — 1123F ACP DISCUSS/DSCN MKR DOCD: CPT | Performed by: INTERNAL MEDICINE

## 2022-01-21 PROCEDURE — 4040F PNEUMOC VAC/ADMIN/RCVD: CPT | Performed by: INTERNAL MEDICINE

## 2022-01-21 PROCEDURE — G0439 PPPS, SUBSEQ VISIT: HCPCS | Performed by: INTERNAL MEDICINE

## 2022-01-21 RX ORDER — LISINOPRIL 30 MG/1
30 TABLET ORAL DAILY
Qty: 90 TABLET | Refills: 1 | Status: SHIPPED | OUTPATIENT
Start: 2022-01-21 | End: 2022-06-13 | Stop reason: SDUPTHER

## 2022-01-21 RX ORDER — HYDROCHLOROTHIAZIDE 12.5 MG/1
12.5 CAPSULE, GELATIN COATED ORAL DAILY
Qty: 90 CAPSULE | Refills: 1 | Status: SHIPPED | OUTPATIENT
Start: 2022-01-21 | End: 2022-01-21 | Stop reason: SDUPTHER

## 2022-01-21 RX ORDER — LISINOPRIL 30 MG/1
30 TABLET ORAL DAILY
Qty: 90 TABLET | Refills: 1 | Status: SHIPPED | OUTPATIENT
Start: 2022-01-21 | End: 2022-01-21 | Stop reason: SDUPTHER

## 2022-01-21 RX ORDER — HYDROCHLOROTHIAZIDE 12.5 MG/1
12.5 CAPSULE, GELATIN COATED ORAL DAILY
Qty: 90 CAPSULE | Refills: 1 | Status: SHIPPED | OUTPATIENT
Start: 2022-01-21 | End: 2022-05-17

## 2022-01-21 ASSESSMENT — LIFESTYLE VARIABLES
HAS A RELATIVE, FRIEND, DOCTOR, OR ANOTHER HEALTH PROFESSIONAL EXPRESSED CONCERN ABOUT YOUR DRINKING OR SUGGESTED YOU CUT DOWN: 0
AUDIT TOTAL SCORE: 4
HOW OFTEN DURING THE LAST YEAR HAVE YOU FOUND THAT YOU WERE NOT ABLE TO STOP DRINKING ONCE YOU HAD STARTED: 0
AUDIT-C TOTAL SCORE: 4
HOW OFTEN DURING THE LAST YEAR HAVE YOU FAILED TO DO WHAT WAS NORMALLY EXPECTED FROM YOU BECAUSE OF DRINKING: 0
HOW MANY STANDARD DRINKS CONTAINING ALCOHOL DO YOU HAVE ON A TYPICAL DAY: 0
HOW OFTEN DURING THE LAST YEAR HAVE YOU HAD A FEELING OF GUILT OR REMORSE AFTER DRINKING: 0
HAVE YOU OR SOMEONE ELSE BEEN INJURED AS A RESULT OF YOUR DRINKING: 0
HOW OFTEN DO YOU HAVE SIX OR MORE DRINKS ON ONE OCCASION: 0
HOW OFTEN DURING THE LAST YEAR HAVE YOU NEEDED AN ALCOHOLIC DRINK FIRST THING IN THE MORNING TO GET YOURSELF GOING AFTER A NIGHT OF HEAVY DRINKING: 0
HOW OFTEN DURING THE LAST YEAR HAVE YOU BEEN UNABLE TO REMEMBER WHAT HAPPENED THE NIGHT BEFORE BECAUSE YOU HAD BEEN DRINKING: 0
HOW OFTEN DO YOU HAVE A DRINK CONTAINING ALCOHOL: 4

## 2022-01-21 ASSESSMENT — PATIENT HEALTH QUESTIONNAIRE - PHQ9
2. FEELING DOWN, DEPRESSED OR HOPELESS: 0
SUM OF ALL RESPONSES TO PHQ QUESTIONS 1-9: 0
1. LITTLE INTEREST OR PLEASURE IN DOING THINGS: 0
SUM OF ALL RESPONSES TO PHQ9 QUESTIONS 1 & 2: 0

## 2022-01-21 NOTE — TELEPHONE ENCOUNTER
Last appointment: 1/21/2022  Next appointment: Visit date not found  Last refill: Please send to new pharmacy

## 2022-01-21 NOTE — TELEPHONE ENCOUNTER
Patient had a visit with the doctor and he states that the following medications were sent to the wrong pharmacy:        lisinopril (PRINIVIL;ZESTRIL) 30 MG tablet Take 1 tablet by mouth daily       hydroCHLOROthiazide (MICROZIDE) 12.5 MG capsule Take 1 capsule by mouth daily     He says he uses the pharmacy below and would like to get them filled here:      [facility], [facility] -  [phone]

## 2022-01-21 NOTE — PATIENT INSTRUCTIONS
Stop lisinopril 20mg /hctz 12.5 combo drug and change to lisinopril 30 mg and hctz 12.5 separately. Ok to use up current supply  Contact me with home blood pressure readings    Labs    ---    Personalized Preventive Plan for Ronna Cogan - 1/21/2022  Medicare offers a range of preventive health benefits. Some of the tests and screenings are paid in full while other may be subject to a deductible, co-insurance, and/or copay. Some of these benefits include a comprehensive review of your medical history including lifestyle, illnesses that may run in your family, and various assessments and screenings as appropriate. After reviewing your medical record and screening and assessments performed today your provider may have ordered immunizations, labs, imaging, and/or referrals for you. A list of these orders (if applicable) as well as your Preventive Care list are included within your After Visit Summary for your review. Other Preventive Recommendations:    · A preventive eye exam performed by an eye specialist is recommended every 1-2 years to screen for glaucoma; cataracts, macular degeneration, and other eye disorders. · A preventive dental visit is recommended every 6 months. · Try to get at least 150 minutes of exercise per week or 10,000 steps per day on a pedometer . · Order or download the FREE \"Exercise & Physical Activity: Your Everyday Guide\" from [employer]. Call 0-999.656.9453 or search [employer] online. · You need 5572-7591 mg of calcium and 8846-3728 IU of vitamin D per day. It is possible to meet your calcium requirement with diet alone, but a vitamin D supplement is usually necessary to meet this goal.  · When exposed to the sun, use a sunscreen that protects against both UVA and UVB radiation with an SPF of 30 or greater. Reapply every 2 to 3 hours or after sweating, drying off with a towel, or swimming.   · Always wear a seat belt when traveling in a car. Always wear a helmet when riding a bicycle or motorcycle. Personalized Preventive Plan for Natalya Plasencia - 1/21/2022  Medicare offers a range of preventive health benefits. Some of the tests and screenings are paid in full while other may be subject to a deductible, co-insurance, and/or copay. Some of these benefits include a comprehensive review of your medical history including lifestyle, illnesses that may run in your family, and various assessments and screenings as appropriate. After reviewing your medical record and screening and assessments performed today your provider may have ordered immunizations, labs, imaging, and/or referrals for you. A list of these orders (if applicable) as well as your Preventive Care list are included within your After Visit Summary for your review. Other Preventive Recommendations:    A preventive eye exam performed by an eye specialist is recommended every 1-2 years to screen for glaucoma; cataracts, macular degeneration, and other eye disorders. A preventive dental visit is recommended every 6 months. Try to get at least 150 minutes of exercise per week or 10,000 steps per day on a pedometer . Order or download the FREE \"Exercise & Physical Activity: Your Everyday Guide\" from [employer]. Call 1-585.326.1038 or search [employer] online. You need 2408-6577 mg of calcium and 1735-1971 IU of vitamin D per day. It is possible to meet your calcium requirement with diet alone, but a vitamin D supplement is usually necessary to meet this goal.  When exposed to the sun, use a sunscreen that protects against both UVA and UVB radiation with an SPF of 30 or greater. Reapply every 2 to 3 hours or after sweating, drying off with a towel, or swimming. Always wear a seat belt when traveling in a car. Always wear a helmet when riding a bicycle or motorcycle.

## 2022-03-21 ENCOUNTER — TELEPHONE (OUTPATIENT)
Dept: INTERNAL MEDICINE CLINIC | Age: 87
End: 2022-03-21

## 2022-03-21 NOTE — TELEPHONE ENCOUNTER
Patients daughter in law is calling requesting to know if there is a Geriatric doctor Dr. Ashley Mckeon recommends. I made her aware that Dr. Ashley Mckeon can do both, but she states the family is concerned and wants to change to a Geriatric doctor due to dementia. Please contact James Morgan (she is on HIPAA) at phone # provided.

## 2022-03-21 NOTE — TELEPHONE ENCOUNTER
Please advise patient/family he can get a geriatric assessment with Dr. Francine Tran at [facility].  [phone]

## 2022-03-24 ENCOUNTER — TELEPHONE (OUTPATIENT)
Dept: INTERNAL MEDICINE CLINIC | Age: 87
End: 2022-03-24

## 2022-03-24 NOTE — TELEPHONE ENCOUNTER
----- Message from Rodrigo Rubio sent at 3/24/2022  8:42 AM EDT -----  Subject: Message to Provider    QUESTIONS  Information for Provider? Seven Wright reports that her mom is anxious about the   colonoscopy reschedule due to dementia, and asking the same questions,   which is stressing out her dad, who is confused and Seven Wright is concerned   about potential dementia for him, that he's \"not able to process things   right now. \" Requests that when her parents are contacted that she be   contacted as well. She requests that her mom be put on mild anxiety   medication and wants to discuss her patients' symptoms. Please contact her   to discuss further and advise.  ---------------------------------------------------------------------------  --------------  CALL BACK INFO  What is the best way for the office to contact you? OK to leave message on   voicemail  Preferred Call Back Phone Number?  [phone]  ---------------------------------------------------------------------------  --------------  SCRIPT ANSWERS  undefined

## 2022-04-05 ENCOUNTER — TELEPHONE (OUTPATIENT)
Dept: INTERNAL MEDICINE CLINIC | Age: 87
End: 2022-04-05

## 2022-04-05 NOTE — TELEPHONE ENCOUNTER
Jer New, patient's daughter in law, is on patient's HIPAA, and has concerns with patient's medication list. She wants to make sure the correct medications are being taken. She is requesting to go over patient's medications, and is requesting to be called today, stating that she is there ready to do this. Aware doctor  is out of the office today  Please contact her @ phone # provided.

## 2022-05-17 RX ORDER — HYDROCHLOROTHIAZIDE 12.5 MG/1
CAPSULE, GELATIN COATED ORAL
Qty: 90 CAPSULE | Refills: 1 | Status: SHIPPED | OUTPATIENT
Start: 2022-05-17 | End: 2022-06-13 | Stop reason: SDUPTHER

## 2022-06-09 ENCOUNTER — TELEPHONE (OUTPATIENT)
Dept: INTERNAL MEDICINE CLINIC | Age: 87
End: 2022-06-09

## 2022-06-09 DIAGNOSIS — E78.49 OTHER HYPERLIPIDEMIA: ICD-10-CM

## 2022-06-09 DIAGNOSIS — K21.00 GASTROESOPHAGEAL REFLUX DISEASE WITH ESOPHAGITIS: Chronic | ICD-10-CM

## 2022-06-09 DIAGNOSIS — E55.9 VITAMIN D DEFICIENCY: Chronic | ICD-10-CM

## 2022-06-09 DIAGNOSIS — M1A.9XX0 CHRONIC GOUT WITHOUT TOPHUS, UNSPECIFIED CAUSE, UNSPECIFIED SITE: Chronic | ICD-10-CM

## 2022-06-09 NOTE — TELEPHONE ENCOUNTER
Pt daughter called to go through the medications     But as the MA was updating the medication list the pt daughter had questions on which medications and supplements she should be taking      They are requesting a call back from the Md

## 2022-06-09 NOTE — TELEPHONE ENCOUNTER
Maru Zechariah is requesting to speak with the MA or the Doctor to go over patient's medication list, stating that she believes patient has been prescribed medicationDr. Yolie Rosenberg is unaware of. She is requesting this due to wanting to change pharmacy's and was told by the pharmacy this is what she needed to do. \"    I've added the pharmacy they are switching to in patients chart. [facility] [phone] - F [phone]     Please contact her@ phone # provided.

## 2022-06-13 RX ORDER — HYDROCHLOROTHIAZIDE 12.5 MG/1
CAPSULE, GELATIN COATED ORAL
Qty: 90 CAPSULE | Refills: 3 | Status: SHIPPED | OUTPATIENT
Start: 2022-06-13

## 2022-06-13 RX ORDER — ALLOPURINOL 100 MG/1
TABLET ORAL
Qty: 90 TABLET | Refills: 3 | Status: SHIPPED | OUTPATIENT
Start: 2022-06-13

## 2022-06-13 RX ORDER — ASPIRIN 81 MG/1
81 TABLET ORAL DAILY
Qty: 90 TABLET | Refills: 3 | Status: SHIPPED | OUTPATIENT
Start: 2022-06-13

## 2022-06-13 RX ORDER — LISINOPRIL 30 MG/1
30 TABLET ORAL DAILY
Qty: 90 TABLET | Refills: 3 | Status: SHIPPED | OUTPATIENT
Start: 2022-06-13 | End: 2022-06-24

## 2022-06-13 RX ORDER — PANTOPRAZOLE SODIUM 40 MG/1
TABLET, DELAYED RELEASE ORAL
Qty: 90 TABLET | Refills: 3 | Status: SHIPPED | OUTPATIENT
Start: 2022-06-13

## 2022-06-13 RX ORDER — SIMVASTATIN 20 MG
20 TABLET ORAL NIGHTLY
Qty: 90 TABLET | Refills: 3 | Status: SHIPPED | OUTPATIENT
Start: 2022-06-13

## 2022-06-13 RX ORDER — M-VIT,TX,IRON,MINS/CALC/FOLIC 27MG-0.4MG
1 TABLET ORAL DAILY
Qty: 90 TABLET | Refills: 3 | Status: SHIPPED | OUTPATIENT
Start: 2022-06-13 | End: 2023-06-13

## 2022-06-13 RX ORDER — MULTIVIT-MIN/IRON/FOLIC ACID/K 18-600-40
1 CAPSULE ORAL DAILY
Qty: 90 CAPSULE | Refills: 3 | Status: SHIPPED | OUTPATIENT
Start: 2022-06-13

## 2022-06-24 NOTE — TELEPHONE ENCOUNTER
Patient called back and states he does not use [facility]. Please send all RX's to [facility] on [address] [facility] in [place].

## 2022-06-26 RX ORDER — LISINOPRIL 30 MG/1
TABLET ORAL
Qty: 3 TABLET | Refills: 0 | Status: SHIPPED | OUTPATIENT
Start: 2022-06-26 | End: 2022-07-27 | Stop reason: SDUPTHER

## 2022-07-27 ENCOUNTER — TELEPHONE (OUTPATIENT)
Dept: INTERNAL MEDICINE CLINIC | Age: 87
End: 2022-07-27

## 2022-07-27 RX ORDER — LISINOPRIL 30 MG/1
TABLET ORAL
Qty: 4 TABLET | Refills: 0 | Status: SHIPPED | OUTPATIENT
Start: 2022-07-27

## 2022-07-27 RX ORDER — LISINOPRIL 30 MG/1
TABLET ORAL
Qty: 3 TABLET | Refills: 0 | OUTPATIENT
Start: 2022-07-27

## 2022-07-27 NOTE — TELEPHONE ENCOUNTER
Daughter Severino Garcia is calling to check on status of Lisinopril re-fill. Please fill ASAP. Thanks.

## 2022-07-27 NOTE — TELEPHONE ENCOUNTER
Patient's daughter is calling because she picked up his pill pack from [facility] but it did not have the Lisinopril in it? Is he still taking? If so, can we send in RX for [facility] in [place] for her to  and put the Lisionpril in his pill pack? ?  Please call her to discuss at number provided.

## 2022-07-27 NOTE — TELEPHONE ENCOUNTER
I sent it to [facility] on 6/13. See documentation. Does she still need it sent to [facility]?

## 2022-07-27 NOTE — TELEPHONE ENCOUNTER
Pt's daughter would like to have a refill sent to [facility] was also called to inquire on the medication     It was stated that the medication was refilled in may which made the refill that was to branden get canceled     The pill is being delivered on 7/31/2022   The pt is out of medication     The pt will need to have a script of 4 tabs sent to [facility] to tide him over until the pill pack delivery

## 2022-07-27 NOTE — TELEPHONE ENCOUNTER
Bonny's daughter Rodolfo Crocker is calling back to check on re-fill request status for Lisinopril. Please call her with update at number provided.

## 2022-09-13 ENCOUNTER — TELEPHONE (OUTPATIENT)
Dept: INTERNAL MEDICINE CLINIC | Age: 87
End: 2022-09-13

## 2022-09-13 NOTE — TELEPHONE ENCOUNTER
Patient's daughter is calling because Saturday night patient and his wife thong came down with bad diarrhea. Please call Lynn Tristan at number provided to discuss how to treat.

## 2022-09-13 NOTE — TELEPHONE ENCOUNTER
Started with diarrhea Sunday, a little after his wife who seems to be improving. He is still feeling tired, and has had so far 2 Bms today. Told daughter he should follow BRAT diet, plenty of fluids, probiotics.  If not improving in next 1-2 days, she should let me know

## 2022-09-23 ENCOUNTER — HOSPITAL ENCOUNTER (OUTPATIENT)
Dept: OCCUPATIONAL THERAPY | Age: 87
Setting detail: THERAPIES SERIES
Discharge: HOME | End: 2022-09-23
Payer: MEDICARE

## 2022-09-23 PROCEDURE — 97165 OT EVAL LOW COMPLEX 30 MIN: CPT

## 2022-09-23 PROCEDURE — 97537 COMMUNITY/WORK REINTEGRATION: CPT

## 2022-09-23 NOTE — PROGRESS NOTES
9/23/2022    Dear Dr. Kana Banda (MR# [id number]) was seen by Occupational Therapy on 9/23/2022 for a s Evaluation at [address] [place] [address].  As you know, Mr. Shoshana Valdez suffers from a Mild Cognitive Impairment. He wants to maintain driving to be independent in community mobility and leisure skills. Mr. Shoshana Valdez passed tests for visual acuity, saccades, pursuits, depth perception, peripheral vision, color vision, and traffic signs and signals. He was administered the Trails Making Tests Part A and B which are cognitive tests that involve scanning, speed of mental processing, visual motor sequencing, as well as switching cognitive sets. On Part A, he scored below normal limits with 68 seconds over a norm of 60 seconds and on Part B, he scored below normal limits with 300 seconds over the norm of 180 seconds. He demonstrated functional physical capacity for driving. Behind the wheel, Mr. Shoshana Valdez was able to manipulate all vehicle controls. He drove on secondary and primary roads in light to moderately heavy traffic. He demonstrated the ability to park, back up, maintain speed and traffic flow, change lanes and follow directions. Responses to situations encountered were appropriate. There were reports of two instances when he got lost.  Both times he was driving in areas in which he was not familiar. Based on the results of this evaluation, it appears that Mr. Shoshana Valdez is a suitable candidate to maintain driving on local, familiar roads within 3 miles of his home and no expressway driving. He is as safe as the driving public. The final decision remains with the physician. Please inform Mr. Shoshana Valdez of your decision. I can be reached at [phone] if questions arise.   Thank you for this referral.    Sincerely,      Alcon Kirkland, [address] [facility], [address] [facility]   Certified  Rehabilitation Specialist

## 2022-09-23 NOTE — PROGRESS NOTES
Outpatient Occupational Therapy  [] HOSP [facility] DR ANGELA ABARCA   Phone: [phone]   Fax: [phone]   [x] [facility]  Phone: [phone]   Fax: [phone]  [] [facility]   Phone: [phone]   Fax: [phone]      To:  Dr. Nuria Sanchez     Patient: Ramya Velasquez  : 1935  MRN: [id number]  Evaluation Date: 2022      Diagnosis Information: Mild Cognitive Impairment            Occupational Therapy Certification/Re-Certification Form  Dear Dr. Nuria Sanchez,  The following patient has been evaluated for occupational therapy services and for therapy to continue, Medicare requires monthly physician review of the treatment plan. Please review the attached evaluation and/or summary of the patient's plan of care, and verify that you agree therapy should continue by signing the attached document and sending it back to our office. Plan of Care/Treatment to date:  [] Therapeutic Exercise   [] Modalities:  [] Therapeutic Activity    [] Ultrasound  [] Electrical Stimulation   [] Activities of Daily Living    [] Fluidotherapy [] Kinesiotaping  [] Neuromuscular Re-education   [] Iontophoresis [] Coldpack/hotpack   [] Instruction in HEP     [] Contrast Bath  [] Manual Therapy     Other:  [] Aquatic Therapy      [x] Maintain driving on local, familiar roads within 3 miles of his         home and no expressway driving    Frequency/Duration:  # Days per week: [] 1 day # Weeks: [] 1 week [] 5 weeks      [] 2 days   [] 2 weeks [] 6 weeks     [] 3 days   [] 3 weeks [] 7 weeks     [] 4 days   [] 4 weeks [] 8 weeks    Rehab Potential: [] excellent [] good [] fair  [] poor       Electronically signed by:  KRISTAN Corona, DANA      If you have any questions or concerns, please don't hesitate to call.   Thank you for your referral.      Physician Signature:________________________________Date:__________________  By signing above, therapists plan is approved by physician
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
## 2022-10-26 ENCOUNTER — NURSE ONLY (OUTPATIENT)
Dept: INTERNAL MEDICINE CLINIC | Age: 87
End: 2022-10-26
Payer: MEDICARE

## 2022-10-26 DIAGNOSIS — Z23 NEEDS FLU SHOT: Primary | ICD-10-CM

## 2022-10-26 PROCEDURE — G0008 ADMIN INFLUENZA VIRUS VAC: HCPCS | Performed by: INTERNAL MEDICINE

## 2022-10-26 PROCEDURE — 90694 VACC AIIV4 NO PRSRV 0.5ML IM: CPT | Performed by: INTERNAL MEDICINE

## 2022-12-30 ENCOUNTER — TELEPHONE (OUTPATIENT)
Dept: INTERNAL MEDICINE CLINIC | Age: 87
End: 2022-12-30

## 2022-12-30 NOTE — TELEPHONE ENCOUNTER
Patient's daughter Jayne Madison is returning a call  from our office for both her father and mother Meme Ansari. Please callSusan at number provided.

## 2023-05-19 DIAGNOSIS — E55.9 VITAMIN D DEFICIENCY: Chronic | ICD-10-CM

## 2023-05-19 DIAGNOSIS — E78.49 OTHER HYPERLIPIDEMIA: ICD-10-CM

## 2023-05-19 DIAGNOSIS — K21.00 GASTROESOPHAGEAL REFLUX DISEASE WITH ESOPHAGITIS: Chronic | ICD-10-CM

## 2023-05-19 DIAGNOSIS — M1A.9XX0 CHRONIC GOUT WITHOUT TOPHUS, UNSPECIFIED CAUSE, UNSPECIFIED SITE: Chronic | ICD-10-CM

## 2023-05-21 RX ORDER — SIMVASTATIN 20 MG
TABLET ORAL
Qty: 31 TABLET | Refills: 0 | Status: SHIPPED | OUTPATIENT
Start: 2023-05-21 | End: 2023-06-16

## 2023-05-21 RX ORDER — M-VIT,TX,IRON,MINS/CALC/FOLIC 27MG-0.4MG
TABLET ORAL
Qty: 31 TABLET | Refills: 0 | Status: SHIPPED | OUTPATIENT
Start: 2023-05-21

## 2023-05-21 RX ORDER — ALLOPURINOL 100 MG/1
TABLET ORAL
Qty: 31 TABLET | Refills: 0 | Status: SHIPPED | OUTPATIENT
Start: 2023-05-21 | End: 2023-06-16

## 2023-05-21 RX ORDER — HYDROCHLOROTHIAZIDE 12.5 MG/1
CAPSULE, GELATIN COATED ORAL
Qty: 31 CAPSULE | Refills: 0 | Status: SHIPPED | OUTPATIENT
Start: 2023-05-21 | End: 2023-06-15

## 2023-05-21 RX ORDER — ASPIRIN 81 MG/1
TABLET, COATED ORAL
Qty: 31 TABLET | Refills: 0 | Status: SHIPPED | OUTPATIENT
Start: 2023-05-21 | End: 2023-06-16

## 2023-05-21 RX ORDER — ACETAMINOPHEN 160 MG
TABLET,DISINTEGRATING ORAL
Qty: 31 CAPSULE | Refills: 11 | Status: SHIPPED | OUTPATIENT
Start: 2023-05-21

## 2023-05-21 RX ORDER — PANTOPRAZOLE SODIUM 40 MG/1
TABLET, DELAYED RELEASE ORAL
Qty: 31 TABLET | Refills: 0 | Status: SHIPPED | OUTPATIENT
Start: 2023-05-21 | End: 2023-06-16

## 2023-06-15 ENCOUNTER — OFFICE VISIT (OUTPATIENT)
Dept: INTERNAL MEDICINE CLINIC | Age: 88
End: 2023-06-15
Payer: MEDICARE

## 2023-06-15 VITALS
HEIGHT: 68 IN | OXYGEN SATURATION: 97 % | WEIGHT: 139 LBS | BODY MASS INDEX: 21.07 KG/M2 | DIASTOLIC BLOOD PRESSURE: 60 MMHG | SYSTOLIC BLOOD PRESSURE: 115 MMHG | HEART RATE: 70 BPM

## 2023-06-15 DIAGNOSIS — M1A.9XX0 CHRONIC GOUT WITHOUT TOPHUS, UNSPECIFIED CAUSE, UNSPECIFIED SITE: ICD-10-CM

## 2023-06-15 DIAGNOSIS — F41.1 GENERALIZED ANXIETY DISORDER: ICD-10-CM

## 2023-06-15 DIAGNOSIS — I10 ESSENTIAL HYPERTENSION: ICD-10-CM

## 2023-06-15 DIAGNOSIS — K21.00 GASTROESOPHAGEAL REFLUX DISEASE WITH ESOPHAGITIS WITHOUT HEMORRHAGE: ICD-10-CM

## 2023-06-15 DIAGNOSIS — R00.0 TACHYCARDIA: ICD-10-CM

## 2023-06-15 DIAGNOSIS — R00.0 TACHYCARDIA: Primary | ICD-10-CM

## 2023-06-15 DIAGNOSIS — E78.49 OTHER HYPERLIPIDEMIA: ICD-10-CM

## 2023-06-15 LAB
DEPRECATED RDW RBC AUTO: 13.3 % (ref 12.4–15.4)
HCT VFR BLD AUTO: 46.5 % (ref 40.5–52.5)
HGB BLD-MCNC: 15.6 G/DL (ref 13.5–17.5)
MCH RBC QN AUTO: 31.7 PG (ref 26–34)
MCHC RBC AUTO-ENTMCNC: 33.7 G/DL (ref 31–36)
MCV RBC AUTO: 94.3 FL (ref 80–100)
PLATELET # BLD AUTO: 226 K/UL (ref 135–450)
PMV BLD AUTO: 7.9 FL (ref 5–10.5)
RBC # BLD AUTO: 4.93 M/UL (ref 4.2–5.9)
WBC # BLD AUTO: 9.5 K/UL (ref 4–11)

## 2023-06-15 PROCEDURE — G8420 CALC BMI NORM PARAMETERS: HCPCS | Performed by: INTERNAL MEDICINE

## 2023-06-15 PROCEDURE — 1123F ACP DISCUSS/DSCN MKR DOCD: CPT | Performed by: INTERNAL MEDICINE

## 2023-06-15 PROCEDURE — G8427 DOCREV CUR MEDS BY ELIG CLIN: HCPCS | Performed by: INTERNAL MEDICINE

## 2023-06-15 PROCEDURE — 99214 OFFICE O/P EST MOD 30 MIN: CPT | Performed by: INTERNAL MEDICINE

## 2023-06-15 PROCEDURE — 1036F TOBACCO NON-USER: CPT | Performed by: INTERNAL MEDICINE

## 2023-06-15 RX ORDER — MV-MN/OM3/DHA/EPA/FISH/LUT/ZEA 250-5-1 MG
1 CAPSULE ORAL DAILY
COMMUNITY
Start: 2023-05-30

## 2023-06-15 RX ORDER — CITALOPRAM 10 MG/1
10 TABLET ORAL DAILY
COMMUNITY
Start: 2023-05-30

## 2023-06-15 ASSESSMENT — PATIENT HEALTH QUESTIONNAIRE - PHQ9
SUM OF ALL RESPONSES TO PHQ QUESTIONS 1-9: 0
2. FEELING DOWN, DEPRESSED OR HOPELESS: 0
SUM OF ALL RESPONSES TO PHQ QUESTIONS 1-9: 0
1. LITTLE INTEREST OR PLEASURE IN DOING THINGS: 0
SUM OF ALL RESPONSES TO PHQ9 QUESTIONS 1 & 2: 0

## 2023-06-16 LAB
ALBUMIN SERPL-MCNC: 4.3 G/DL (ref 3.4–5)
ALBUMIN/GLOB SERPL: 1.9 {RATIO} (ref 1.1–2.2)
ALP SERPL-CCNC: 62 U/L (ref 40–129)
ALT SERPL-CCNC: 43 U/L (ref 10–40)
ANION GAP SERPL CALCULATED.3IONS-SCNC: 15 MMOL/L (ref 3–16)
AST SERPL-CCNC: 26 U/L (ref 15–37)
BILIRUB SERPL-MCNC: 0.4 MG/DL (ref 0–1)
BUN SERPL-MCNC: 31 MG/DL (ref 7–20)
CALCIUM SERPL-MCNC: 9.9 MG/DL (ref 8.3–10.6)
CHLORIDE SERPL-SCNC: 99 MMOL/L (ref 99–110)
CHOLEST SERPL-MCNC: 205 MG/DL (ref 0–199)
CO2 SERPL-SCNC: 24 MMOL/L (ref 21–32)
CREAT SERPL-MCNC: 1.2 MG/DL (ref 0.8–1.3)
GFR SERPLBLD CREATININE-BSD FMLA CKD-EPI: 58 ML/MIN/{1.73_M2}
GLUCOSE SERPL-MCNC: 107 MG/DL (ref 70–99)
HDLC SERPL-MCNC: 52 MG/DL (ref 40–60)
LDLC SERPL CALC-MCNC: 116 MG/DL
MAGNESIUM SERPL-MCNC: 2 MG/DL (ref 1.8–2.4)
POTASSIUM SERPL-SCNC: 4.4 MMOL/L (ref 3.5–5.1)
PROT SERPL-MCNC: 6.6 G/DL (ref 6.4–8.2)
SODIUM SERPL-SCNC: 138 MMOL/L (ref 136–145)
TRIGL SERPL-MCNC: 185 MG/DL (ref 0–150)
TSH SERPL DL<=0.005 MIU/L-ACNC: 1.01 UIU/ML (ref 0.27–4.2)
URATE SERPL-MCNC: 4.8 MG/DL (ref 3.5–7.2)
VLDLC SERPL CALC-MCNC: 37 MG/DL

## 2023-06-18 DIAGNOSIS — E78.49 OTHER HYPERLIPIDEMIA: ICD-10-CM

## 2023-06-18 RX ORDER — SIMVASTATIN 40 MG
40 TABLET ORAL NIGHTLY
Qty: 31 TABLET | Refills: 5 | Status: SHIPPED | OUTPATIENT
Start: 2023-06-18 | End: 2023-12-21

## 2023-06-22 RX ORDER — M-VIT,TX,IRON,MINS/CALC/FOLIC 27MG-0.4MG
TABLET ORAL
Qty: 31 TABLET | Refills: 11 | Status: SHIPPED | OUTPATIENT
Start: 2023-06-22

## 2023-06-22 NOTE — TELEPHONE ENCOUNTER
Medication:   Requested Prescriptions     Pending Prescriptions Disp Refills    Multiple Vitamins-Minerals (THERAPEUTIC MULTIVITAMIN-MINERALS) tablet [Pharmacy Med Name: MULTIVIT-MINERALS TABLET] 31 tablet 11     Sig: TAKE 1 TABLET BY MOUTH ONCE DAILY     Last Filled:  ? Last appt: 6/15/2023   Next appt: 9/13/2023    Last OARRS: No flowsheet data found.

## 2023-12-07 DIAGNOSIS — E78.49 OTHER HYPERLIPIDEMIA: ICD-10-CM

## 2023-12-07 RX ORDER — SIMVASTATIN 40 MG
TABLET ORAL
Qty: 31 TABLET | Refills: 0 | Status: SHIPPED | OUTPATIENT
Start: 2023-12-07

## 2023-12-07 NOTE — TELEPHONE ENCOUNTER
Medication:   Requested Prescriptions     Pending Prescriptions Disp Refills    simvastatin (ZOCOR) 40 MG tablet [Pharmacy Med Name: SIMVASTATIN 40 MG TABLET] 31 tablet 11     Sig: TAKE 1 TABLET BY MOUTH ONCE NIGHTLY.      Last Filled:  # 31 with 5 refills on 6/18/23    Last appt: 6/15/2023   Next appt: Visit date not found    Last OARRS:        No data to display               6/15/23 1131 1/21/22 1043 7/7/21 0908 1/8/21 0850 2/4/20 0814 2/5/19 0946 8/16/18 0822     Cholesterol, Total 0 - 199 mg/dL 205 High  172 180 189      Triglycerides 0 - 150 mg/dL 185 High  224 High  303 High  217 High       HDL 40 - 60 mg/dL 52 50 48 50 43 41

## 2024-01-09 DIAGNOSIS — E78.49 OTHER HYPERLIPIDEMIA: ICD-10-CM

## 2024-01-09 RX ORDER — SIMVASTATIN 40 MG
TABLET ORAL
Qty: 31 TABLET | Refills: 0 | Status: SHIPPED | OUTPATIENT
Start: 2024-01-09 | End: 2024-02-06

## 2024-01-09 NOTE — TELEPHONE ENCOUNTER
Called patient and someone (male voice) said that he wasn't home. Informed the person to have patient give us a call back to schedule an overdue office visit.

## 2024-02-06 DIAGNOSIS — E78.49 OTHER HYPERLIPIDEMIA: ICD-10-CM

## 2024-02-06 RX ORDER — SIMVASTATIN 40 MG
TABLET ORAL
Qty: 29 TABLET | Refills: 0 | Status: SHIPPED | OUTPATIENT
Start: 2024-02-06

## 2024-02-21 ENCOUNTER — OFFICE VISIT (OUTPATIENT)
Dept: INTERNAL MEDICINE CLINIC | Age: 89
End: 2024-02-21

## 2024-02-21 VITALS
OXYGEN SATURATION: 97 % | SYSTOLIC BLOOD PRESSURE: 118 MMHG | HEART RATE: 59 BPM | WEIGHT: 153.2 LBS | DIASTOLIC BLOOD PRESSURE: 72 MMHG | BODY MASS INDEX: 23.29 KG/M2

## 2024-02-21 DIAGNOSIS — M1A.9XX0 CHRONIC GOUT WITHOUT TOPHUS, UNSPECIFIED CAUSE, UNSPECIFIED SITE: ICD-10-CM

## 2024-02-21 DIAGNOSIS — K21.00 GASTROESOPHAGEAL REFLUX DISEASE WITH ESOPHAGITIS WITHOUT HEMORRHAGE: ICD-10-CM

## 2024-02-21 DIAGNOSIS — I10 ESSENTIAL HYPERTENSION: ICD-10-CM

## 2024-02-21 DIAGNOSIS — E78.49 OTHER HYPERLIPIDEMIA: ICD-10-CM

## 2024-02-21 DIAGNOSIS — E78.49 OTHER HYPERLIPIDEMIA: Primary | ICD-10-CM

## 2024-02-21 SDOH — ECONOMIC STABILITY: HOUSING INSECURITY
IN THE LAST 12 MONTHS, WAS THERE A TIME WHEN YOU DID NOT HAVE A STEADY PLACE TO SLEEP OR SLEPT IN A SHELTER (INCLUDING NOW)?: NO

## 2024-02-21 SDOH — ECONOMIC STABILITY: FOOD INSECURITY: WITHIN THE PAST 12 MONTHS, THE FOOD YOU BOUGHT JUST DIDN'T LAST AND YOU DIDN'T HAVE MONEY TO GET MORE.: NEVER TRUE

## 2024-02-21 SDOH — ECONOMIC STABILITY: FOOD INSECURITY: WITHIN THE PAST 12 MONTHS, YOU WORRIED THAT YOUR FOOD WOULD RUN OUT BEFORE YOU GOT MONEY TO BUY MORE.: NEVER TRUE

## 2024-02-21 SDOH — ECONOMIC STABILITY: INCOME INSECURITY: HOW HARD IS IT FOR YOU TO PAY FOR THE VERY BASICS LIKE FOOD, HOUSING, MEDICAL CARE, AND HEATING?: NOT HARD AT ALL

## 2024-02-21 ASSESSMENT — PATIENT HEALTH QUESTIONNAIRE - PHQ9
SUM OF ALL RESPONSES TO PHQ QUESTIONS 1-9: 0
1. LITTLE INTEREST OR PLEASURE IN DOING THINGS: 0
2. FEELING DOWN, DEPRESSED OR HOPELESS: 0
SUM OF ALL RESPONSES TO PHQ QUESTIONS 1-9: 0
SUM OF ALL RESPONSES TO PHQ QUESTIONS 1-9: 0
SUM OF ALL RESPONSES TO PHQ9 QUESTIONS 1 & 2: 0

## 2024-02-21 NOTE — PATIENT INSTRUCTIONS
Next fall, consider flu and covid and RSV shots    Get Shingrix if not already done at pharmacy    Labs today

## 2024-02-21 NOTE — PROGRESS NOTES
Dylan Plasencia (:  1935) is a 88 y.o. male, here for evaluation of the following chief complaint(s):    Follow-up      ASSESSMENT/PLAN:  1. Other hyperlipidemia  Check labs on simvastatin  -     Lipid Panel; Future  2. Chronic gout without tophus, unspecified cause, unspecified site  stable on allopurinol  -     Uric Acid; Future  3. Gastroesophageal reflux disease with esophagitis without hemorrhage  Stable on pantoprazole  -     Magnesium; Future  4. Essential hypertension  Well-controlled on metoprolol and Lisinopril  -     Comprehensive Metabolic Panel; Future    Generalized anxiety disorder  Well-controlled on Celexa - sees geriatric specialist     Mild cognitive impairment  -He has a home health aide and family has restricted driving.     Weight loss-  Resolved/improved. Was likely due to grieving for his wife.    Return in about 6 months (around 2024).    SUBJECTIVE/OBJECTIVE:  HPI  Patient is here for routine visit.  I had concerns for weight loss at the last visit but this has resolved he has picked up 14 pounds since last visit.  He states he is no longer driving but he does have a .  He states he has visitors at home.  He states that although it is hard, he is now managing after his wife's passing last year.    He is not sure if he is still seeing the geriatrician      Review of Systems    Past Medical History:   Diagnosis Date    Abnormal cardiovascular stress test 2016 - [facility] --- 1.4 mm ST segment depression V4-V6    Allergic rhinitis     Zamarripa's esophagus 1997    Cancer (HCC)     Chronic gout without tophus 2016    Erectile dysfunction     Essential hypertension     Gastroesophageal reflux disease with esophagitis 2016    GERD (gastroesophageal reflux disease)     Hearing loss     has aids    Hyperlipidemia     Kidney stones     Medullary sponge kidney     Peptic ulcer 1996    located at the GE junction, stricture

## 2024-02-22 LAB
ALBUMIN SERPL-MCNC: 4.4 G/DL (ref 3.4–5)
ALBUMIN/GLOB SERPL: 2.1 {RATIO} (ref 1.1–2.2)
ALP SERPL-CCNC: 70 U/L (ref 40–129)
ALT SERPL-CCNC: 37 U/L (ref 10–40)
ANION GAP SERPL CALCULATED.3IONS-SCNC: 9 MMOL/L (ref 3–16)
AST SERPL-CCNC: 27 U/L (ref 15–37)
BILIRUB SERPL-MCNC: 0.4 MG/DL (ref 0–1)
BUN SERPL-MCNC: 30 MG/DL (ref 7–20)
CALCIUM SERPL-MCNC: 9.6 MG/DL (ref 8.3–10.6)
CHLORIDE SERPL-SCNC: 99 MMOL/L (ref 99–110)
CHOLEST SERPL-MCNC: 180 MG/DL (ref 0–199)
CO2 SERPL-SCNC: 29 MMOL/L (ref 21–32)
CREAT SERPL-MCNC: 1.2 MG/DL (ref 0.8–1.3)
GFR SERPLBLD CREATININE-BSD FMLA CKD-EPI: 58 ML/MIN/{1.73_M2}
GLUCOSE SERPL-MCNC: 95 MG/DL (ref 70–99)
HDLC SERPL-MCNC: 42 MG/DL (ref 40–60)
LDLC SERPL CALC-MCNC: 104 MG/DL
MAGNESIUM SERPL-MCNC: 2.4 MG/DL (ref 1.8–2.4)
POTASSIUM SERPL-SCNC: 4.5 MMOL/L (ref 3.5–5.1)
PROT SERPL-MCNC: 6.5 G/DL (ref 6.4–8.2)
SODIUM SERPL-SCNC: 137 MMOL/L (ref 136–145)
TRIGL SERPL-MCNC: 172 MG/DL (ref 0–150)
URATE SERPL-MCNC: 3.7 MG/DL (ref 3.5–7.2)
VLDLC SERPL CALC-MCNC: 34 MG/DL

## 2024-03-08 DIAGNOSIS — E78.49 OTHER HYPERLIPIDEMIA: ICD-10-CM

## 2024-03-08 RX ORDER — SIMVASTATIN 40 MG
TABLET ORAL
Qty: 31 TABLET | Refills: 11 | Status: SHIPPED | OUTPATIENT
Start: 2024-03-08

## 2024-05-14 DIAGNOSIS — E55.9 VITAMIN D DEFICIENCY: Chronic | ICD-10-CM

## 2024-05-14 RX ORDER — ACETAMINOPHEN 160 MG
TABLET,DISINTEGRATING ORAL
Qty: 31 CAPSULE | Refills: 11 | Status: SHIPPED | OUTPATIENT
Start: 2024-05-14

## 2024-05-21 DIAGNOSIS — F41.1 GENERALIZED ANXIETY DISORDER: ICD-10-CM

## 2024-05-21 RX ORDER — CITALOPRAM HYDROBROMIDE 10 MG/1
10 TABLET ORAL DAILY
Qty: 90 TABLET | Refills: 0 | OUTPATIENT
Start: 2024-05-21

## 2024-05-21 NOTE — TELEPHONE ENCOUNTER
Last appointment: 2/21/2024  Next appointment: 8/21/2024  Last refill: 4/26/24 30 day supply per another provider    Please advise

## 2024-05-21 NOTE — TELEPHONE ENCOUNTER
Patients azarugher in-law Debbie Plasencia called ior Dr. Muniz to see if she can refill medication:    citalopram (CELEXA) 10 MG tablet   Last appointment: 2/21/2024  Next appointment: 8/21/2024  Last refill: 5/30/23       [facility] would not refill the medication stating the patient need's to be seen with pcp in order to get it refilled, please advise.    Preferred Pharmacy for the medication to be sent to:    [facility] - [place], [place] - [address] - P [phone] - F [phone] [id number], [facility] [address]  Phone: [phone]  Fax: [phone]

## 2024-05-30 NOTE — TELEPHONE ENCOUNTER
Called and let debbie know that this med has never been prescribed by dr Luna,   Figured out this comes from Dr Prakash.     Debbie will call that office and request the refill.   Voices understanding and appreciation for clarification

## 2024-05-30 NOTE — TELEPHONE ENCOUNTER
Patients Daughter in-law Debbie called back for Dr. Muniz in regards to the medication citalopram (CELEXA) 10 MG tablet that was refused. Haley wants to see if a new script can be sent to the pharmacy to get this medication filled asap, please advise.     Patients daughter in-law's call back# [phone].

## 2024-06-06 DIAGNOSIS — K21.00 GASTROESOPHAGEAL REFLUX DISEASE WITH ESOPHAGITIS: Chronic | ICD-10-CM

## 2024-06-06 DIAGNOSIS — M1A.9XX0 CHRONIC GOUT WITHOUT TOPHUS, UNSPECIFIED CAUSE, UNSPECIFIED SITE: Chronic | ICD-10-CM

## 2024-06-06 RX ORDER — ALLOPURINOL 100 MG/1
TABLET ORAL
Qty: 31 TABLET | Refills: 5 | Status: SHIPPED | OUTPATIENT
Start: 2024-06-06

## 2024-06-06 RX ORDER — ASPIRIN 81 MG/1
TABLET, COATED ORAL
Qty: 31 TABLET | Refills: 5 | Status: SHIPPED | OUTPATIENT
Start: 2024-06-06

## 2024-06-06 RX ORDER — PANTOPRAZOLE SODIUM 40 MG/1
TABLET, DELAYED RELEASE ORAL
Qty: 31 TABLET | Refills: 5 | Status: SHIPPED | OUTPATIENT
Start: 2024-06-06

## 2024-06-06 RX ORDER — LISINOPRIL 30 MG/1
TABLET ORAL
Qty: 31 TABLET | Refills: 5 | Status: SHIPPED | OUTPATIENT
Start: 2024-06-06

## 2024-06-07 RX ORDER — M-VIT,TX,IRON,MINS/CALC/FOLIC 27MG-0.4MG
TABLET ORAL
Qty: 30 TABLET | Refills: 11 | Status: SHIPPED | OUTPATIENT
Start: 2024-06-07

## 2024-08-21 ENCOUNTER — OFFICE VISIT (OUTPATIENT)
Dept: INTERNAL MEDICINE CLINIC | Age: 89
End: 2024-08-21

## 2024-08-21 VITALS
HEIGHT: 70 IN | OXYGEN SATURATION: 97 % | HEART RATE: 62 BPM | DIASTOLIC BLOOD PRESSURE: 80 MMHG | BODY MASS INDEX: 21.24 KG/M2 | SYSTOLIC BLOOD PRESSURE: 140 MMHG | WEIGHT: 148.4 LBS

## 2024-08-21 DIAGNOSIS — M1A.9XX0 CHRONIC GOUT WITHOUT TOPHUS, UNSPECIFIED CAUSE, UNSPECIFIED SITE: ICD-10-CM

## 2024-08-21 DIAGNOSIS — N18.31 CHRONIC KIDNEY DISEASE, STAGE 3A (HCC): ICD-10-CM

## 2024-08-21 DIAGNOSIS — K21.00 GASTROESOPHAGEAL REFLUX DISEASE WITH ESOPHAGITIS WITHOUT HEMORRHAGE: ICD-10-CM

## 2024-08-21 DIAGNOSIS — F41.1 GENERALIZED ANXIETY DISORDER: ICD-10-CM

## 2024-08-21 DIAGNOSIS — I10 ESSENTIAL HYPERTENSION: Primary | ICD-10-CM

## 2024-08-21 NOTE — PROGRESS NOTES
back: Normal range of motion.   Lymphadenopathy:      Cervical: No cervical adenopathy.   Skin:     General: Skin is warm and dry.   Neurological:      Mental Status: He is alert. Mental status is at baseline.      Cranial Nerves: No cranial nerve deficit.      Sensory: No sensory deficit.      Coordination: Coordination normal.   Psychiatric:         Mood and Affect: Mood normal.               This note was generated completely or in part utilizing Dragon dictation speech recognition software.  Occasionally, words are mistranscribed and despite editing, the text may contain inaccuracies due to incorrect word recognition.  If further clarification is needed please contact the office at [phone]          An electronic signature was used to authenticate this note.    --DIANDRA ROMAN MD

## 2024-08-28 PROBLEM — N18.31 CHRONIC KIDNEY DISEASE, STAGE 3A (HCC): Status: ACTIVE | Noted: 2024-08-28

## 2024-10-07 ENCOUNTER — TELEPHONE (OUTPATIENT)
Dept: INTERNAL MEDICINE CLINIC | Age: 89
End: 2024-10-07

## 2024-10-07 NOTE — TELEPHONE ENCOUNTER
Pt daughter Helga called in for [facility] in regards to getting the pt an appointment. Helga stated pt has been off for the past couple of weeks and would like for the pt to get in and get blood work done and discuss the issues going on. She didn't go into detail after asking just he has been off. Please advise if we can squeeze pt in this week or can the pt be seen by another provider due to availability. She did say patient has an dentist appt on wed @11.

## 2024-10-09 ENCOUNTER — OFFICE VISIT (OUTPATIENT)
Dept: INTERNAL MEDICINE CLINIC | Age: 89
End: 2024-10-09
Payer: MEDICARE

## 2024-10-09 VITALS
BODY MASS INDEX: 21.19 KG/M2 | HEIGHT: 70 IN | SYSTOLIC BLOOD PRESSURE: 132 MMHG | DIASTOLIC BLOOD PRESSURE: 66 MMHG | TEMPERATURE: 97.6 F | HEART RATE: 64 BPM | WEIGHT: 148 LBS | OXYGEN SATURATION: 99 %

## 2024-10-09 DIAGNOSIS — F02.80 ALZHEIMER'S DEMENTIA OF OTHER ONSET, UNSPECIFIED DEMENTIA SEVERITY, UNSPECIFIED WHETHER BEHAVIORAL, PSYCHOTIC, OR MOOD DISTURBANCE OR ANXIETY (HCC): ICD-10-CM

## 2024-10-09 DIAGNOSIS — R41.82 ALTERED MENTAL STATUS, UNSPECIFIED ALTERED MENTAL STATUS TYPE: ICD-10-CM

## 2024-10-09 DIAGNOSIS — R41.82 ALTERED MENTAL STATUS, UNSPECIFIED ALTERED MENTAL STATUS TYPE: Primary | ICD-10-CM

## 2024-10-09 DIAGNOSIS — G30.8 ALZHEIMER'S DEMENTIA OF OTHER ONSET, UNSPECIFIED DEMENTIA SEVERITY, UNSPECIFIED WHETHER BEHAVIORAL, PSYCHOTIC, OR MOOD DISTURBANCE OR ANXIETY (HCC): ICD-10-CM

## 2024-10-09 PROCEDURE — G8420 CALC BMI NORM PARAMETERS: HCPCS | Performed by: INTERNAL MEDICINE

## 2024-10-09 PROCEDURE — G8427 DOCREV CUR MEDS BY ELIG CLIN: HCPCS | Performed by: INTERNAL MEDICINE

## 2024-10-09 PROCEDURE — 90653 IIV ADJUVANT VACCINE IM: CPT | Performed by: INTERNAL MEDICINE

## 2024-10-09 PROCEDURE — 1123F ACP DISCUSS/DSCN MKR DOCD: CPT | Performed by: INTERNAL MEDICINE

## 2024-10-09 PROCEDURE — 99214 OFFICE O/P EST MOD 30 MIN: CPT | Performed by: INTERNAL MEDICINE

## 2024-10-09 PROCEDURE — 1036F TOBACCO NON-USER: CPT | Performed by: INTERNAL MEDICINE

## 2024-10-09 PROCEDURE — G8482 FLU IMMUNIZE ORDER/ADMIN: HCPCS | Performed by: INTERNAL MEDICINE

## 2024-10-09 PROCEDURE — G0008 ADMIN INFLUENZA VIRUS VAC: HCPCS | Performed by: INTERNAL MEDICINE

## 2024-10-09 RX ORDER — DONEPEZIL HYDROCHLORIDE 5 MG/1
5 TABLET, FILM COATED ORAL NIGHTLY
Qty: 30 TABLET | Refills: 3 | Status: SHIPPED | OUTPATIENT
Start: 2024-10-09

## 2024-10-09 NOTE — PROGRESS NOTES
Dylan Plasencia (:  1935) is a 89 y.o. male, here for evaluation of the following chief complaint(s):    Follow-up (Daughter wants him to have labs and urinalysis)      ASSESSMENT/PLAN:  1. Altered mental status, unspecified altered mental status type  Unclear etiology of subacute changes in mental status, although it may be normal progression of Alzheimer's disease.  Will check for UTI and related labs.  Consider head CT and or returning sooner to geriatric specialist at [facility].  Encouraged increased social engagement.  With nights and days somewhat confused, okay to take a low-dose of melatonin before bed and perhaps set an alarm in the morning.  -     Urinalysis  -     Culture, Urine  -     Comprehensive Metabolic Panel; Future  -     TSH; Future  -     CBC with Auto Differential; Future  -     Vitamin B12 & Folate; Future  2. Alzheimer's dementia of other onset, unspecified dementia severity, unspecified whether behavioral, psychotic, or mood disturbance or anxiety (HCC)  Patient is agreeable with starting Aricept.  Discussed potential side effects like nausea.  Essential hypertension  Reasonably well-controlled on lisinopril and metoprolol  Chronic kidney disease, stage 3a (HCC)  Patient should avoid NSAIDs  Generalized anxiety disorder  Well-controlled on citalopram  Gastroesophageal reflux disease with esophagitis without hemorrhage  Well-controlled on pantoprazole  Chronic gout without tophus, unspecified cause, unspecified site  Stable on allopurinol     Other hyperlipidemia  Stable on simvastatin  Cognitive impairment  -He has a home health aide and is no longer driving.    ---  Return in about 3 months (around 2025).    SUBJECTIVE/OBJECTIVE:  HPI  Patient is here with his daughter and caregiver.  They report in recent weeks he is not sleeping well and tends to wander around during the night.  Then he will sleep during the day.  He used to be an early riser but now he is sleeping

## 2024-10-09 NOTE — PATIENT INSTRUCTIONS
Urine test  Labs    Start aricept    Consider asking Geriatrics doc and/or Head CT  [facility]    Low dose of melatonin before bed  Alarm in morning

## 2024-10-10 LAB
ALBUMIN SERPL-MCNC: 4.3 G/DL (ref 3.4–5)
ALBUMIN/GLOB SERPL: 2.2 {RATIO} (ref 1.1–2.2)
ALP SERPL-CCNC: 75 U/L (ref 40–129)
ALT SERPL-CCNC: 31 U/L (ref 10–40)
ANION GAP SERPL CALCULATED.3IONS-SCNC: 10 MMOL/L (ref 3–16)
AST SERPL-CCNC: 28 U/L (ref 15–37)
BACTERIA UR CULT: NORMAL
BACTERIA URNS QL MICRO: NORMAL /HPF
BASOPHILS # BLD: 0.1 K/UL (ref 0–0.2)
BASOPHILS NFR BLD: 0.6 %
BILIRUB SERPL-MCNC: 0.3 MG/DL (ref 0–1)
BILIRUB UR QL STRIP.AUTO: NEGATIVE
BUN SERPL-MCNC: 38 MG/DL (ref 7–20)
CALCIUM SERPL-MCNC: 9.4 MG/DL (ref 8.3–10.6)
CHLORIDE SERPL-SCNC: 103 MMOL/L (ref 99–110)
CLARITY UR: CLEAR
CO2 SERPL-SCNC: 27 MMOL/L (ref 21–32)
COLOR UR: YELLOW
CREAT SERPL-MCNC: 1.1 MG/DL (ref 0.8–1.3)
DEPRECATED RDW RBC AUTO: 13.2 % (ref 12.4–15.4)
EOSINOPHIL # BLD: 0.2 K/UL (ref 0–0.6)
EOSINOPHIL NFR BLD: 1.9 %
EPI CELLS #/AREA URNS AUTO: 0 /HPF (ref 0–5)
FOLATE SERPL-MCNC: 29.1 NG/ML (ref 4.78–24.2)
GFR SERPLBLD CREATININE-BSD FMLA CKD-EPI: 64 ML/MIN/{1.73_M2}
GLUCOSE SERPL-MCNC: 81 MG/DL (ref 70–99)
GLUCOSE UR STRIP.AUTO-MCNC: NEGATIVE MG/DL
HCT VFR BLD AUTO: 46.1 % (ref 40.5–52.5)
HGB BLD-MCNC: 15.7 G/DL (ref 13.5–17.5)
HGB UR QL STRIP.AUTO: NEGATIVE
HYALINE CASTS #/AREA URNS AUTO: 1 /LPF (ref 0–8)
KETONES UR STRIP.AUTO-MCNC: NEGATIVE MG/DL
LEUKOCYTE ESTERASE UR QL STRIP.AUTO: NEGATIVE
LYMPHOCYTES # BLD: 3.2 K/UL (ref 1–5.1)
LYMPHOCYTES NFR BLD: 30.1 %
MCH RBC QN AUTO: 31.8 PG (ref 26–34)
MCHC RBC AUTO-ENTMCNC: 34.2 G/DL (ref 31–36)
MCV RBC AUTO: 93.2 FL (ref 80–100)
MONOCYTES # BLD: 1.2 K/UL (ref 0–1.3)
MONOCYTES NFR BLD: 11 %
NEUTROPHILS # BLD: 6 K/UL (ref 1.7–7.7)
NEUTROPHILS NFR BLD: 56.4 %
NITRITE UR QL STRIP.AUTO: NEGATIVE
PH UR STRIP.AUTO: 6 [PH] (ref 5–8)
PLATELET # BLD AUTO: 219 K/UL (ref 135–450)
PMV BLD AUTO: 8.6 FL (ref 5–10.5)
POTASSIUM SERPL-SCNC: 4.7 MMOL/L (ref 3.5–5.1)
PROT SERPL-MCNC: 6.3 G/DL (ref 6.4–8.2)
PROT UR STRIP.AUTO-MCNC: ABNORMAL MG/DL
RBC # BLD AUTO: 4.94 M/UL (ref 4.2–5.9)
RBC CLUMPS #/AREA URNS AUTO: 3 /HPF (ref 0–4)
SODIUM SERPL-SCNC: 140 MMOL/L (ref 136–145)
SP GR UR STRIP.AUTO: 1.02 (ref 1–1.03)
TSH SERPL DL<=0.005 MIU/L-ACNC: 0.82 UIU/ML (ref 0.27–4.2)
UA DIPSTICK W REFLEX MICRO PNL UR: YES
URN SPEC COLLECT METH UR: ABNORMAL
UROBILINOGEN UR STRIP-ACNC: 0.2 E.U./DL
VIT B12 SERPL-MCNC: 764 PG/ML (ref 211–911)
WBC # BLD AUTO: 10.6 K/UL (ref 4–11)
WBC #/AREA URNS AUTO: 0 /HPF (ref 0–5)

## 2024-12-06 DIAGNOSIS — M1A.9XX0 CHRONIC GOUT WITHOUT TOPHUS, UNSPECIFIED CAUSE, UNSPECIFIED SITE: Chronic | ICD-10-CM

## 2024-12-06 DIAGNOSIS — K21.00 GASTROESOPHAGEAL REFLUX DISEASE WITH ESOPHAGITIS: Chronic | ICD-10-CM

## 2024-12-08 RX ORDER — ASPIRIN 81 MG/1
81 TABLET, COATED ORAL DAILY
Qty: 31 TABLET | Refills: 5 | Status: SHIPPED | OUTPATIENT
Start: 2024-12-08

## 2024-12-08 RX ORDER — ALLOPURINOL 100 MG/1
TABLET ORAL
Qty: 31 TABLET | Refills: 5 | Status: SHIPPED | OUTPATIENT
Start: 2024-12-08

## 2024-12-08 RX ORDER — PANTOPRAZOLE SODIUM 40 MG/1
TABLET, DELAYED RELEASE ORAL
Qty: 31 TABLET | Refills: 5 | Status: SHIPPED | OUTPATIENT
Start: 2024-12-08

## 2024-12-08 RX ORDER — LISINOPRIL 30 MG/1
TABLET ORAL
Qty: 31 TABLET | Refills: 5 | Status: SHIPPED | OUTPATIENT
Start: 2024-12-08

## 2025-01-08 ENCOUNTER — OFFICE VISIT (OUTPATIENT)
Dept: INTERNAL MEDICINE CLINIC | Age: 89
End: 2025-01-08

## 2025-01-08 VITALS
HEART RATE: 60 BPM | DIASTOLIC BLOOD PRESSURE: 80 MMHG | BODY MASS INDEX: 21.52 KG/M2 | OXYGEN SATURATION: 95 % | SYSTOLIC BLOOD PRESSURE: 160 MMHG | WEIGHT: 150 LBS

## 2025-01-08 DIAGNOSIS — K21.00 GASTROESOPHAGEAL REFLUX DISEASE WITH ESOPHAGITIS WITHOUT HEMORRHAGE: ICD-10-CM

## 2025-01-08 DIAGNOSIS — G30.8 ALZHEIMER'S DEMENTIA OF OTHER ONSET, UNSPECIFIED DEMENTIA SEVERITY, UNSPECIFIED WHETHER BEHAVIORAL, PSYCHOTIC, OR MOOD DISTURBANCE OR ANXIETY (HCC): ICD-10-CM

## 2025-01-08 DIAGNOSIS — F02.80 ALZHEIMER'S DEMENTIA OF OTHER ONSET, UNSPECIFIED DEMENTIA SEVERITY, UNSPECIFIED WHETHER BEHAVIORAL, PSYCHOTIC, OR MOOD DISTURBANCE OR ANXIETY (HCC): ICD-10-CM

## 2025-01-08 DIAGNOSIS — I10 ESSENTIAL HYPERTENSION: Primary | ICD-10-CM

## 2025-01-08 DIAGNOSIS — M1A.9XX0 CHRONIC GOUT WITHOUT TOPHUS, UNSPECIFIED CAUSE, UNSPECIFIED SITE: ICD-10-CM

## 2025-01-08 RX ORDER — LISINOPRIL 40 MG/1
TABLET ORAL
Qty: 90 TABLET | Refills: 0 | Status: SHIPPED | OUTPATIENT
Start: 2025-01-08 | End: 2025-04-08

## 2025-01-08 RX ORDER — DONEPEZIL HYDROCHLORIDE 10 MG/1
10 TABLET, FILM COATED ORAL NIGHTLY
Qty: 90 TABLET | Refills: 0 | Status: SHIPPED | OUTPATIENT
Start: 2025-01-08 | End: 2025-04-08

## 2025-01-08 SDOH — ECONOMIC STABILITY: FOOD INSECURITY: WITHIN THE PAST 12 MONTHS, YOU WORRIED THAT YOUR FOOD WOULD RUN OUT BEFORE YOU GOT MONEY TO BUY MORE.: NEVER TRUE

## 2025-01-08 SDOH — ECONOMIC STABILITY: FOOD INSECURITY: WITHIN THE PAST 12 MONTHS, THE FOOD YOU BOUGHT JUST DIDN'T LAST AND YOU DIDN'T HAVE MONEY TO GET MORE.: NEVER TRUE

## 2025-01-08 ASSESSMENT — PATIENT HEALTH QUESTIONNAIRE - PHQ9
SUM OF ALL RESPONSES TO PHQ QUESTIONS 1-9: 0
2. FEELING DOWN, DEPRESSED OR HOPELESS: NOT AT ALL
SUM OF ALL RESPONSES TO PHQ QUESTIONS 1-9: 0
SUM OF ALL RESPONSES TO PHQ9 QUESTIONS 1 & 2: 0
1. LITTLE INTEREST OR PLEASURE IN DOING THINGS: NOT AT ALL
SUM OF ALL RESPONSES TO PHQ QUESTIONS 1-9: 0
SUM OF ALL RESPONSES TO PHQ QUESTIONS 1-9: 0

## 2025-01-08 NOTE — PATIENT INSTRUCTIONS
RSV and Covid vaccine at pharmacy    Stop lisinopril 30 mg and start lisinopril 40 mg instead  Stay on metoprolol

## 2025-01-08 NOTE — PROGRESS NOTES
Dylan Plasencia (:  1935) is a 89 y.o. male, here for evaluation of the following chief complaint(s):    3 Month Follow-Up      ASSESSMENT/PLAN:  1. Essential hypertension  Not well-controlled.  Continue metoprolol and increase to  -     lisinopril (PRINIVIL;ZESTRIL) 40 MG tablet; TAKE 1 TABLET BY MOUTH ONCE DAILYTAKE 1 TABLET BY MOUTH ONCE DAILY, Disp-90 tablet, R-0Normal  2. Alzheimer's dementia of other onset, unspecified dementia severity, unspecified whether behavioral, psychotic, or mood disturbance or anxiety ([facility])  -     Patient is tolerating 5 mg dose so we will increase to donepezil (ARICEPT) 10 MG tablet; Take 1 tablet by mouth nightly, Disp-90 tablet, R-0Normal  3. Gastroesophageal reflux disease with esophagitis without hemorrhage  Well-controlled on pantoprazole  4. Chronic gout without tophus, unspecified cause, unspecified site  Stable on allopurinol  Chronic kidney disease, stage 3a (HCC)  Patient should avoid NSAIDs  Generalized anxiety disorder  Well-controlled on citalopram  Other hyperlipidemia  Stable on simvastatin    Return in about 3 months (around 2025).    SUBJECTIVE/OBJECTIVE:  HPI  Patient is here for routine visit.  His caregiver and daughter think that Aricept may be helping some.    MoCA score of 16 today.  Strong with questions related to numbers    He denies new complaints or side effects from Aricept    Review of Systems    Past Medical History:   Diagnosis Date    Abnormal cardiovascular stress test 2016 - [facility] --- 1.4 mm ST segment depression V4-V6    Allergic rhinitis     Alzheimer's dementia (HCC)     Zamarripa's esophagus 1997    Cancer ([facility])     Chronic gout without tophus 2016    Erectile dysfunction     Essential hypertension     Gastroesophageal reflux disease with esophagitis 2016    GERD (gastroesophageal reflux disease)     Hearing loss     has aids    Hyperlipidemia     Kidney stones     Medullary

## 2025-01-24 ENCOUNTER — PATIENT MESSAGE (OUTPATIENT)
Dept: INTERNAL MEDICINE CLINIC | Age: 89
End: 2025-01-24

## 2025-01-24 DIAGNOSIS — G30.8 ALZHEIMER'S DEMENTIA OF OTHER ONSET, UNSPECIFIED DEMENTIA SEVERITY, UNSPECIFIED WHETHER BEHAVIORAL, PSYCHOTIC, OR MOOD DISTURBANCE OR ANXIETY (HCC): Primary | ICD-10-CM

## 2025-01-24 DIAGNOSIS — F02.80 ALZHEIMER'S DEMENTIA OF OTHER ONSET, UNSPECIFIED DEMENTIA SEVERITY, UNSPECIFIED WHETHER BEHAVIORAL, PSYCHOTIC, OR MOOD DISTURBANCE OR ANXIETY (HCC): Primary | ICD-10-CM

## 2025-01-27 RX ORDER — DONEPEZIL HYDROCHLORIDE 5 MG/1
5 TABLET, FILM COATED ORAL NIGHTLY
Qty: 90 TABLET | Refills: 0 | Status: SHIPPED | OUTPATIENT
Start: 2025-01-27

## 2025-01-27 NOTE — TELEPHONE ENCOUNTER
Last appointment: 1/8/2025  Next appointment: 2/24/2025      Last refill:   Recent dosage change per [employer] message dated 1/26/25  5mg daily.   Qty 90 0 refills pended  for review

## 2025-02-19 NOTE — PROGRESS NOTES
Medicare Annual Wellness Visit    Dylan Plasencia is here for Medicare AWV    Assessment & Plan   Medicare annual wellness visit, subsequent  Preventive healthcare addressed, see dermatology for precancerous skin lesions on scalp  Chronic gout without tophus, unspecified cause, unspecified site  Stable on allopurinol  -     Uric Acid; Future  Gastroesophageal reflux disease with esophagitis without hemorrhage  Stable on PPI  -     Magnesium; Future  Other hyperlipidemia  Stable on statin  -     Lipid Panel; Future  Essential hypertension  Well-controlled on lisinopril and metoprolol  Alzheimer's dementia of other onset, unspecified dementia severity, unspecified whether behavioral, psychotic, or mood disturbance or anxiety (HCC)  -     Patient is tolerating 5 mg dose and didn't tolerate 10 mg dose    Chronic kidney disease, stage 3a (HCC)  Patient should avoid NSAIDs  Generalized anxiety disorder  Well-controlled on citalopram         Return in about 6 months (around 8/24/2025) for hypertension.     Subjective       Here to follow-up high blood pressure medication adjustment and AWV.  Overall he feels well and has no new complaints.    Patient's complete Health Risk Assessment and screening values have been reviewed and are found in Flowsheets. The following problems were reviewed today and where indicated follow up appointments were made and/or referrals ordered.    Positive Risk Factor Screenings with Interventions:     Cognitive:   Clock Drawing Test (CDT): Normal  Words recalled: 0 Words Recalled  Total Score: (!) 2  Total Score Interpretation: Abnormal Mini-Cog  Interventions:  Stable dementia           General HRA Questions:  Select all that apply: (!) Loneliness  Interventions - Loneliness:  Discussed moving to independent/memory care             ADL's:   Patient reports needing help with:  Select all that apply: (!) Laundry, Food Preparation, Shopping, Transportation, Taking Medications, Banking/Finances,

## 2025-02-24 ENCOUNTER — OFFICE VISIT (OUTPATIENT)
Dept: INTERNAL MEDICINE CLINIC | Age: 89
End: 2025-02-24
Payer: MEDICARE

## 2025-02-24 VITALS
OXYGEN SATURATION: 98 % | BODY MASS INDEX: 21.47 KG/M2 | HEART RATE: 59 BPM | DIASTOLIC BLOOD PRESSURE: 70 MMHG | SYSTOLIC BLOOD PRESSURE: 138 MMHG | HEIGHT: 70 IN | WEIGHT: 150 LBS

## 2025-02-24 DIAGNOSIS — E78.49 OTHER HYPERLIPIDEMIA: Chronic | ICD-10-CM

## 2025-02-24 DIAGNOSIS — R74.01 TRANSAMINITIS: ICD-10-CM

## 2025-02-24 DIAGNOSIS — M1A.9XX0 CHRONIC GOUT WITHOUT TOPHUS, UNSPECIFIED CAUSE, UNSPECIFIED SITE: ICD-10-CM

## 2025-02-24 DIAGNOSIS — K21.00 GASTROESOPHAGEAL REFLUX DISEASE WITH ESOPHAGITIS WITHOUT HEMORRHAGE: ICD-10-CM

## 2025-02-24 DIAGNOSIS — I10 ESSENTIAL HYPERTENSION: ICD-10-CM

## 2025-02-24 DIAGNOSIS — Z00.00 MEDICARE ANNUAL WELLNESS VISIT, SUBSEQUENT: Primary | ICD-10-CM

## 2025-02-24 PROCEDURE — 99213 OFFICE O/P EST LOW 20 MIN: CPT | Performed by: INTERNAL MEDICINE

## 2025-02-24 PROCEDURE — 1036F TOBACCO NON-USER: CPT | Performed by: INTERNAL MEDICINE

## 2025-02-24 PROCEDURE — 1160F RVW MEDS BY RX/DR IN RCRD: CPT | Performed by: INTERNAL MEDICINE

## 2025-02-24 PROCEDURE — 1159F MED LIST DOCD IN RCRD: CPT | Performed by: INTERNAL MEDICINE

## 2025-02-24 PROCEDURE — G0439 PPPS, SUBSEQ VISIT: HCPCS | Performed by: INTERNAL MEDICINE

## 2025-02-24 PROCEDURE — G8420 CALC BMI NORM PARAMETERS: HCPCS | Performed by: INTERNAL MEDICINE

## 2025-02-24 PROCEDURE — G8427 DOCREV CUR MEDS BY ELIG CLIN: HCPCS | Performed by: INTERNAL MEDICINE

## 2025-02-24 PROCEDURE — G2211 COMPLEX E/M VISIT ADD ON: HCPCS | Performed by: INTERNAL MEDICINE

## 2025-02-24 PROCEDURE — 1123F ACP DISCUSS/DSCN MKR DOCD: CPT | Performed by: INTERNAL MEDICINE

## 2025-02-24 RX ORDER — LISINOPRIL 40 MG/1
TABLET ORAL
Qty: 90 TABLET | Refills: 0 | Status: SHIPPED | OUTPATIENT
Start: 2025-02-24 | End: 2025-05-25

## 2025-02-24 ASSESSMENT — PATIENT HEALTH QUESTIONNAIRE - PHQ9
SUM OF ALL RESPONSES TO PHQ QUESTIONS 1-9: 0
SUM OF ALL RESPONSES TO PHQ QUESTIONS 1-9: 0
SUM OF ALL RESPONSES TO PHQ9 QUESTIONS 1 & 2: 0
1. LITTLE INTEREST OR PLEASURE IN DOING THINGS: NOT AT ALL
2. FEELING DOWN, DEPRESSED OR HOPELESS: NOT AT ALL
SUM OF ALL RESPONSES TO PHQ QUESTIONS 1-9: 0
SUM OF ALL RESPONSES TO PHQ QUESTIONS 1-9: 0

## 2025-02-24 ASSESSMENT — LIFESTYLE VARIABLES
HOW MANY STANDARD DRINKS CONTAINING ALCOHOL DO YOU HAVE ON A TYPICAL DAY: 1 OR 2
HOW OFTEN DO YOU HAVE A DRINK CONTAINING ALCOHOL: MONTHLY OR LESS

## 2025-02-24 NOTE — PATIENT INSTRUCTIONS
Tdap due at pharmacy  [facility]  [phone]     Learning About Mild Cognitive Impairment (MCI)  What is mild cognitive impairment (MCI)?     It's common to forget things sometimes as we get older. But some older people have memory loss that's more than normal aging. It's called mild cognitive impairment, or MCI. It is not the same as dementia.  People with the condition often know that their memory or mental function has changed. Tests may show some loss. But their minds work well overall. They can carry out daily tasks that are normal for them.  People with MCI have a higher chance of one day getting dementia. But not all people who have it will get dementia. Some people may stay the same over time.  What are the symptoms?  People with MCI have more memory loss than what occurs with normal aging. They may have increasing trouble with recalling words and keeping up with conversations. They may also have trouble remembering important events and making decisions.  What puts you at risk?  The risk of getting MCI increases with age. Having high blood pressure or having a family history of MCI may also increase your risk.  How is it diagnosed?  Your doctor will do a physical exam.  You may be asked questions to check your memory and other mental skills. Your doctor may also talk to close friends and family members. This can help the doctor figure out how your memory and other mental skills have changed.  You may get blood tests and tests that look at your brain.  These questions and tests can make sure you don't have other conditions that can cause symptoms like MCI. These include depression, sleep problems, and side effects from medicines.  How is it treated?  There are no medicines to treat MCI or to keep it from progressing to dementia. But treating conditions like high blood pressure and diabetes may help. A person with MCI needs routine follow-up visits with their doctor to check on changes in the

## 2025-02-25 LAB
ALBUMIN SERPL-MCNC: 4.2 G/DL (ref 3.4–5)
ALBUMIN/GLOB SERPL: 1.8 {RATIO} (ref 1.1–2.2)
ALP SERPL-CCNC: 75 U/L (ref 40–129)
ALT SERPL-CCNC: 76 U/L (ref 10–40)
ANION GAP SERPL CALCULATED.3IONS-SCNC: 12 MMOL/L (ref 3–16)
AST SERPL-CCNC: 47 U/L (ref 15–37)
BILIRUB SERPL-MCNC: 0.5 MG/DL (ref 0–1)
BUN SERPL-MCNC: 26 MG/DL (ref 7–20)
CALCIUM SERPL-MCNC: 9.7 MG/DL (ref 8.3–10.6)
CHLORIDE SERPL-SCNC: 101 MMOL/L (ref 99–110)
CHOLEST SERPL-MCNC: 198 MG/DL (ref 0–199)
CO2 SERPL-SCNC: 24 MMOL/L (ref 21–32)
CREAT SERPL-MCNC: 1.2 MG/DL (ref 0.8–1.3)
GFR SERPLBLD CREATININE-BSD FMLA CKD-EPI: 58 ML/MIN/{1.73_M2}
GLUCOSE SERPL-MCNC: 91 MG/DL (ref 70–99)
HDLC SERPL-MCNC: 43 MG/DL (ref 40–60)
LDLC SERPL CALC-MCNC: 103 MG/DL
MAGNESIUM SERPL-MCNC: 2.03 MG/DL (ref 1.8–2.4)
POTASSIUM SERPL-SCNC: 4.7 MMOL/L (ref 3.5–5.1)
PROT SERPL-MCNC: 6.5 G/DL (ref 6.4–8.2)
SODIUM SERPL-SCNC: 137 MMOL/L (ref 136–145)
TRIGL SERPL-MCNC: 261 MG/DL (ref 0–150)
URATE SERPL-MCNC: 3.6 MG/DL (ref 3.5–7.2)
VLDLC SERPL CALC-MCNC: 52 MG/DL

## 2025-03-07 DIAGNOSIS — E78.49 OTHER HYPERLIPIDEMIA: ICD-10-CM

## 2025-03-07 RX ORDER — SIMVASTATIN 40 MG
TABLET ORAL
Qty: 90 TABLET | Refills: 0 | Status: SHIPPED | OUTPATIENT
Start: 2025-03-07

## 2025-03-10 DIAGNOSIS — R74.01 TRANSAMINITIS: ICD-10-CM

## 2025-03-10 LAB
ALBUMIN SERPL-MCNC: 4.3 G/DL (ref 3.4–5)
ALP SERPL-CCNC: 77 U/L (ref 40–129)
ALT SERPL-CCNC: 45 U/L (ref 10–40)
AST SERPL-CCNC: 31 U/L (ref 15–37)
BILIRUB DIRECT SERPL-MCNC: 0.2 MG/DL (ref 0–0.3)
BILIRUB INDIRECT SERPL-MCNC: 0.3 MG/DL (ref 0–1)
BILIRUB SERPL-MCNC: 0.5 MG/DL (ref 0–1)
PROT SERPL-MCNC: 6.5 G/DL (ref 6.4–8.2)

## 2025-03-11 ENCOUNTER — RESULTS FOLLOW-UP (OUTPATIENT)
Dept: INTERNAL MEDICINE CLINIC | Age: 89
End: 2025-03-11

## 2025-03-26 DIAGNOSIS — I10 ESSENTIAL HYPERTENSION: ICD-10-CM

## 2025-03-26 RX ORDER — LISINOPRIL 40 MG/1
TABLET ORAL
Qty: 90 TABLET | Refills: 0 | Status: SHIPPED | OUTPATIENT
Start: 2025-03-26 | End: 2025-06-24

## 2025-04-08 DIAGNOSIS — G30.8 ALZHEIMER'S DEMENTIA OF OTHER ONSET, UNSPECIFIED DEMENTIA SEVERITY, UNSPECIFIED WHETHER BEHAVIORAL, PSYCHOTIC, OR MOOD DISTURBANCE OR ANXIETY (HCC): ICD-10-CM

## 2025-04-08 DIAGNOSIS — F02.80 ALZHEIMER'S DEMENTIA OF OTHER ONSET, UNSPECIFIED DEMENTIA SEVERITY, UNSPECIFIED WHETHER BEHAVIORAL, PSYCHOTIC, OR MOOD DISTURBANCE OR ANXIETY (HCC): ICD-10-CM

## 2025-04-08 RX ORDER — DONEPEZIL HYDROCHLORIDE 5 MG/1
5 TABLET, FILM COATED ORAL NIGHTLY
Qty: 90 TABLET | Refills: 1 | Status: SHIPPED | OUTPATIENT
Start: 2025-04-08

## 2025-04-08 NOTE — TELEPHONE ENCOUNTER
Last appointment: 2/24/2025  Next appointment: Visit date not found  Return in about 6 months (around 8/24/2025) for hypertension.          Last refill: 1/27/25

## 2025-04-15 NOTE — PROGRESS NOTES
3    allopurinol (ZYLOPRIM) 100 MG tablet Take 1 tablet by mouth  daily 90 tablet 3    lisinopril-hydrochlorothiazide (PRINZIDE;ZESTORETIC) 20-12.5 MG per tablet Take 1 tablet by mouth daily 90 tablet 3    Multiple Vitamins-Minerals (OCUVITE) TABS oral tablet Take 1 tablet by mouth daily 90 tablet 3    nitroGLYCERIN (NITROSTAT) 0.4 MG SL tablet Place 1 tablet under the tongue every 5 minutes as needed for Chest pain 25 tablet 12    Cholecalciferol (VITAMIN D) 2000 UNITS CAPS capsule Take 1 capsule by mouth daily. 90 capsule 3    Omega-3 Fatty Acids (FISH OIL) 1000 MG CAPS Take 1 capsule by mouth daily. 30 capsule 12    aspirin EC 81 MG EC tablet Take 1 tablet by mouth daily. With food. 30 tablet 12    hydrocortisone 1 % cream Apply  topically. apply to the affected area twice a day until healed 30 g 12    multivitamin (THERAGRAN) per tablet Take 1 tablet by mouth daily. No current facility-administered medications on file prior to visit.         Past Medical History:   Diagnosis Date    Abnormal cardiovascular stress test 4/6/2016 April 1, 2016 [facility] --- 1.4 mm ST segment depression V4-V6    Zamarripa's esophagus January 9, 1997    Basal cell carcinoma of face     Cataract 10/28/2011    Chronic gout without tophus 9/9/2016    Elevated liver function tests 1/17/2013    Erectile dysfunction     Esophageal stricture January 9, 1997    Essential hypertension     Gastroesophageal reflux disease with esophagitis 7/6/2016    Gastroesophageal reflux disease without esophagitis     GERD (gastroesophageal reflux disease)     Gout     Hyperglycemia     Hyperlipidemia     Hypertension     Inguinal hernia 7/16/2013    Kidney stones 1980    Medullary sponge kidney 1980    Peptic ulcer November 1996    located at the [address] [place] [address] & [facility]     Premature ventricular contractions 1/15/2013    Right bundle branch block 7/14/2015    Vitamin D deficiency 1/17/2013       Past Surgical History:
Ambulatory

## 2025-05-07 DIAGNOSIS — E55.9 VITAMIN D DEFICIENCY: Chronic | ICD-10-CM

## 2025-05-07 DIAGNOSIS — I10 ESSENTIAL HYPERTENSION: ICD-10-CM

## 2025-05-07 RX ORDER — LISINOPRIL 40 MG/1
40 TABLET ORAL DAILY
Qty: 30 TABLET | Refills: 5 | Status: SHIPPED | OUTPATIENT
Start: 2025-05-07

## 2025-05-07 RX ORDER — ACETAMINOPHEN 160 MG
2000 TABLET,DISINTEGRATING ORAL DAILY
Qty: 31 CAPSULE | Refills: 5 | Status: SHIPPED | OUTPATIENT
Start: 2025-05-07

## 2025-05-07 NOTE — TELEPHONE ENCOUNTER
Last appointment: 2/24/2025  Next appointment: 08/28/2025   Last refill:   Vitamin D3: 05/14/2024  Lisinopril: 03/26/2025

## 2025-05-14 ENCOUNTER — OFFICE VISIT (OUTPATIENT)
Dept: INTERNAL MEDICINE CLINIC | Age: 89
End: 2025-05-14

## 2025-05-14 ENCOUNTER — HOSPITAL ENCOUNTER (EMERGENCY)
Age: 89
Discharge: HOME | End: 2025-05-14
Attending: STUDENT IN AN ORGANIZED HEALTH CARE EDUCATION/TRAINING PROGRAM
Payer: MEDICARE

## 2025-05-14 ENCOUNTER — TELEPHONE (OUTPATIENT)
Dept: INTERNAL MEDICINE CLINIC | Age: 89
End: 2025-05-14

## 2025-05-14 VITALS
TEMPERATURE: 99.1 F | BODY MASS INDEX: 21.05 KG/M2 | HEIGHT: 70 IN | SYSTOLIC BLOOD PRESSURE: 126 MMHG | HEART RATE: 62 BPM | DIASTOLIC BLOOD PRESSURE: 62 MMHG | OXYGEN SATURATION: 93 % | WEIGHT: 147 LBS | RESPIRATION RATE: 19 BRPM

## 2025-05-14 VITALS
DIASTOLIC BLOOD PRESSURE: 65 MMHG | OXYGEN SATURATION: 94 % | TEMPERATURE: 98.1 F | WEIGHT: 147.2 LBS | HEART RATE: 73 BPM | SYSTOLIC BLOOD PRESSURE: 110 MMHG | BODY MASS INDEX: 21.12 KG/M2

## 2025-05-14 DIAGNOSIS — R06.6 HICCUPS: Primary | ICD-10-CM

## 2025-05-14 LAB
ANION GAP SERPL CALCULATED.3IONS-SCNC: 11 MMOL/L (ref 3–16)
BASOPHILS # BLD: 0.1 K/UL (ref 0–0.2)
BASOPHILS NFR BLD: 1 %
BUN SERPL-MCNC: 30 MG/DL (ref 7–20)
CALCIUM SERPL-MCNC: 9.1 MG/DL (ref 8.3–10.6)
CHLORIDE SERPL-SCNC: 102 MMOL/L (ref 99–110)
CO2 SERPL-SCNC: 23 MMOL/L (ref 21–32)
CREAT SERPL-MCNC: 1.2 MG/DL (ref 0.8–1.3)
DEPRECATED RDW RBC AUTO: 13.2 % (ref 12.4–15.4)
EOSINOPHIL # BLD: 0 K/UL (ref 0–0.6)
EOSINOPHIL NFR BLD: 0 %
GFR SERPLBLD CREATININE-BSD FMLA CKD-EPI: 58 ML/MIN/{1.73_M2}
GLUCOSE SERPL-MCNC: 108 MG/DL (ref 70–99)
HCT VFR BLD AUTO: 41 % (ref 40.5–52.5)
HGB BLD-MCNC: 14.1 G/DL (ref 13.5–17.5)
LYMPHOCYTES # BLD: 2.9 K/UL (ref 1–5.1)
LYMPHOCYTES NFR BLD: 35 %
MCH RBC QN AUTO: 31.3 PG (ref 26–34)
MCHC RBC AUTO-ENTMCNC: 34.3 G/DL (ref 31–36)
MCV RBC AUTO: 91.1 FL (ref 80–100)
MONOCYTES # BLD: 0.8 K/UL (ref 0–1.3)
MONOCYTES NFR BLD: 9 %
NEUTROPHILS # BLD: 4.6 K/UL (ref 1.7–7.7)
NEUTROPHILS NFR BLD: 55 %
NT-PROBNP SERPL-MCNC: 936 PG/ML (ref 0–449)
PLATELET # BLD AUTO: 160 K/UL (ref 135–450)
PMV BLD AUTO: 7.1 FL (ref 5–10.5)
POTASSIUM SERPL-SCNC: 4.3 MMOL/L (ref 3.5–5.1)
RBC # BLD AUTO: 4.5 M/UL (ref 4.2–5.9)
RBC MORPH BLD: NORMAL
SMUDGE CELLS BLD QL SMEAR: PRESENT
SODIUM SERPL-SCNC: 136 MMOL/L (ref 136–145)
TROPONIN, HIGH SENSITIVITY: 14 NG/L (ref 0–22)
TROPONIN, HIGH SENSITIVITY: 17 NG/L (ref 0–22)
WBC # BLD AUTO: 8.4 K/UL (ref 4–11)

## 2025-05-14 PROCEDURE — 93005 ELECTROCARDIOGRAM TRACING: CPT

## 2025-05-14 PROCEDURE — 84484 ASSAY OF TROPONIN QUANT: CPT

## 2025-05-14 PROCEDURE — 36415 COLL VENOUS BLD VENIPUNCTURE: CPT

## 2025-05-14 PROCEDURE — 85025 COMPLETE CBC W/AUTO DIFF WBC: CPT

## 2025-05-14 PROCEDURE — 80048 BASIC METABOLIC PNL TOTAL CA: CPT

## 2025-05-14 PROCEDURE — 83880 ASSAY OF NATRIURETIC PEPTIDE: CPT

## 2025-05-14 PROCEDURE — 99284 EMERGENCY DEPT VISIT MOD MDM: CPT

## 2025-05-14 RX ORDER — GABAPENTIN 100 MG/1
100 CAPSULE ORAL NIGHTLY
Qty: 7 CAPSULE | Refills: 0 | Status: SHIPPED | OUTPATIENT
Start: 2025-05-14 | End: 2025-05-21

## 2025-05-14 ASSESSMENT — LIFESTYLE VARIABLES
HOW MANY STANDARD DRINKS CONTAINING ALCOHOL DO YOU HAVE ON A TYPICAL DAY: PATIENT DOES NOT DRINK
HOW OFTEN DO YOU HAVE A DRINK CONTAINING ALCOHOL: NEVER

## 2025-05-14 ASSESSMENT — ENCOUNTER SYMPTOMS
SHORTNESS OF BREATH: 0
CONSTIPATION: 0
SORE THROAT: 0
NAUSEA: 0
VOMITING: 0
BLOOD IN STOOL: 0
TROUBLE SWALLOWING: 0
ABDOMINAL DISTENTION: 0
COUGH: 0
DIARRHEA: 0
ABDOMINAL PAIN: 0

## 2025-05-14 ASSESSMENT — PAIN - FUNCTIONAL ASSESSMENT: PAIN_FUNCTIONAL_ASSESSMENT: NONE - DENIES PAIN

## 2025-05-14 NOTE — PROGRESS NOTES
Dylan Plasencia (:  1935) is a 89 y.o. male, New patient, here for evaluation of the following chief complaint(s):  Hiccups (Ongoing for two days)      Assessment & Plan   ASSESSMENT/PLAN:  1. Hiccups  Acute, uncontrolled  Started 2 days ago  Patient was having continuous hiccups full days today, he slept at 1:30 AM this morning and then started having hiccups again at 9 AM.  Patient is frustrated and irritable because of his hiccups.  He is unable to eat or drink since yesterday.  Patient denies any heartburn, chest pain, shortness of breath, abdominal pain, nausea, vomiting, constipation or diarrhea.  Etiology of the cough is unclear, probably due to chronic GERD for which patient is taking pantoprazole 40 mg daily.  Starting on gabapentin 100 mg 3 times daily to treat hiccups after discussing with patient and the caregiver.  Advised patient to go to emergency if hiccups are not improving in the next 5 to 6 hours or if there is any new symptoms.  -     gabapentin (NEURONTIN) 100 MG capsule; Take 1 capsule by mouth nightly for 7 days. Intended supply: 90 days, Disp-7 capsule, R-0Normal      Return if symptoms worsen or fail to improve.         Subjective   SUBJECTIVE/OBJECTIVE:  Hiccups started 2 days ago. Patient had hiccups all day yesterday. Not able to eat or drink since yestarday.  Started having hiccups this morning.   No nausea, no diarrhea or constipation.   No dysphagia.        Review of Systems   Constitutional:  Negative for fatigue and fever.   HENT:  Negative for nosebleeds, sore throat and trouble swallowing.    Respiratory:  Negative for cough and shortness of breath.    Cardiovascular:  Negative for chest pain, palpitations and leg swelling.   Gastrointestinal:  Negative for abdominal distention, abdominal pain, blood in stool, constipation, diarrhea, nausea and vomiting.   Neurological:  Negative for dizziness and weakness.          Objective   Physical Exam  Constitutional:

## 2025-05-14 NOTE — TELEPHONE ENCOUNTER
The patient daughter Helga called in for Dr. Muniz in regards to seeing what she should do for the patient. The patient kept having hiccups all day yesterday and in the middle of the night. She states that is something that is not normal for him. He also has some congestion going on thinks it maybe related to the hiccups. Also the patients caregiver was at the house today and had taken the patients oxygen, it was at 93 maybe a little lower than normal. Helga wants to know if this is something she should be concerned about. Please advise.     Helga callback# [phone]

## 2025-05-15 LAB
EKG ATRIAL RATE: 59 BPM
EKG DIAGNOSIS: NORMAL
EKG P-R INTERVAL: 292 MS
EKG Q-T INTERVAL: 442 MS
EKG QRS DURATION: 122 MS
EKG QTC CALCULATION (BAZETT): 437 MS
EKG R AXIS: 35 DEGREES
EKG T AXIS: 21 DEGREES
EKG VENTRICULAR RATE: 59 BPM

## 2025-05-15 NOTE — ED PROVIDER NOTES
[facility]  EMERGENCY DEPARTMENT ENCOUNTER          EM RESIDENT NOTE       Date of evaluation: 5/14/2025    Chief Complaint     Hiccups      History of Present Illness     Dylan Plasencia is a 89 y.o. male with a history of hypertension, hyperlipidemia, and chronic kidney disease who presents for evaluation of hiccups. The patient's daughter reports that over the past 48 hours he has had frequent bouts of hiccups. His [place] have tried various remedies at home, including deep inspiration, breath holding, and water, all without success. The patient presented to his PCP earlier today and was prescribed gabapentin for the hiccups. He took the first dose of this at 1400, with resolution of hiccups. However, his hiccups began again at 1830 this evening, prompting ED presentation.    MEDICAL DECISION MAKING / ASSESSMENT / PLAN     INITIAL VITALS: BP: 128/68, Temp: 99.1 °F (37.3 °C), Pulse: 62, Respirations: 19, SpO2: 96 %    Dylan Plasencia is a 89 y.o. male with a history of hypertension, hyperlipidemia, and chronic kidney disease who presents for evaluation of hiccups. On arrival, the patient was well-appearing, afebrile, and hemodynamically stable, without ongoing hiccups. Given his age and risk factors, as well as the rare reports of MI presenting as hiccups, I did obtain a basic cardiac workup here. This was notable for an unchanged EKG, unremarkable CBC, normal electrolytes, troponin of 14, and BNP of 936. The patient has no symptoms of volume overload, nor signs on exam.    At this time, without recurrent hiccups during his nearly three-hour ED stay, I think the patient can safely return home. All questions were answered, and strict return precautions were given.    Is this patient to be included in the SEP-1 core measure? No Exclusion criteria - the patient is NOT to be included for SEP-1 Core Measure due to: Infection is not suspected    Medical Decision Making  Amount and/or Complexity of

## 2025-05-15 NOTE — ED PROVIDER NOTES
ED Attending Attestation Note     Date of evaluation: 5/14/2025    This patient was seen by the resident.  I have seen and examined the patient, agree with the workup, evaluation, management and diagnosis. The care plan has been discussed.  I have reviewed the ECG and concur with the resident's interpretation.  My assessment reveals a well-appearing, asymptomatic 89-year-old male that was presents to the emergency department accompanied by his daughter for concerns of hiccups.    Per his daughter, patient has been struggling with hiccups for the past 1 to 2 days.  Was seen by his PCP and started on gabapentin.  Based on home camera imaging, she reports that he had hiccups for several hours this evening prompting his presentation to the emergency department.     On arrival to the ED, patient denies any current ongoing hiccuping.  No concerning abdominal pain, nausea, vomit.  Denies any associated chest, shortness of breath, syncopal.  Has a reassuring exam including a nonfocal neurological evaluation.  Will plan to obtain basic cardiac workup, as well as assessing his electrolytes.  If reassuring and patient continues to be free of hiccups, anticipate discharge home with PCP follow-up.  Will continue previously prescribed gabapentin in the meantime.        Ivan Montaño MD  05/14/25 [id number]

## 2025-05-15 NOTE — DISCHARGE INSTRUCTIONS
You were seen in the emergency department for evaluation of hiccups. Your workup is very reassuring here, and we think it is safe to return home at this time.    Please continue your gabapentin, and return to the emergency department for any new or worsening symptoms, including chest pain, shortness of breath, or fevers.

## 2025-06-06 DIAGNOSIS — M1A.9XX0 CHRONIC GOUT WITHOUT TOPHUS, UNSPECIFIED CAUSE, UNSPECIFIED SITE: Chronic | ICD-10-CM

## 2025-06-06 DIAGNOSIS — K21.00 GASTROESOPHAGEAL REFLUX DISEASE WITH ESOPHAGITIS: Chronic | ICD-10-CM

## 2025-06-06 RX ORDER — MULTIVIT-MIN/IRON FUM/FOLIC AC 7.5 MG-4
1 TABLET ORAL DAILY
Qty: 30 TABLET | Refills: 5 | Status: SHIPPED | OUTPATIENT
Start: 2025-06-06

## 2025-06-06 RX ORDER — PANTOPRAZOLE SODIUM 40 MG/1
TABLET, DELAYED RELEASE ORAL
Qty: 30 TABLET | Refills: 5 | Status: SHIPPED | OUTPATIENT
Start: 2025-06-06

## 2025-06-06 RX ORDER — ALLOPURINOL 100 MG/1
100 TABLET ORAL DAILY
Qty: 30 TABLET | Refills: 5 | Status: SHIPPED | OUTPATIENT
Start: 2025-06-06

## 2025-06-06 RX ORDER — ASPIRIN 81 MG/1
81 TABLET, COATED ORAL DAILY
Qty: 30 TABLET | Refills: 5 | Status: SHIPPED | OUTPATIENT
Start: 2025-06-06

## 2025-06-06 NOTE — TELEPHONE ENCOUNTER
Last appointment: 2/24/2025  Next appointment: Visit date not found    Still scheduled with dr Henao on 8/25/25  Fwd to  for rescheduling       Last refill: 10/8/24

## 2025-07-03 ENCOUNTER — OFFICE VISIT (OUTPATIENT)
Dept: FAMILY MEDICINE CLINIC | Age: 89
End: 2025-07-03

## 2025-07-03 VITALS
OXYGEN SATURATION: 96 % | WEIGHT: 152 LBS | DIASTOLIC BLOOD PRESSURE: 70 MMHG | SYSTOLIC BLOOD PRESSURE: 116 MMHG | BODY MASS INDEX: 21.81 KG/M2 | HEART RATE: 60 BPM

## 2025-07-03 DIAGNOSIS — M10.9: Primary | ICD-10-CM

## 2025-07-03 DIAGNOSIS — N18.31 CHRONIC KIDNEY DISEASE, STAGE 3A (HCC): ICD-10-CM

## 2025-07-03 RX ORDER — METHYLPREDNISOLONE 4 MG/1
TABLET ORAL
Qty: 1 KIT | Refills: 0 | Status: SHIPPED | OUTPATIENT
Start: 2025-07-03

## 2025-07-03 NOTE — ASSESSMENT & PLAN NOTE
Lab Results   Component Value Date    LABGLOM 58 (A) 05/14/2025    LABGLOM 58 (A) 02/24/2025    LABGLOM 64 10/09/2024    LABGLOM 58 (A) 02/21/2024    LABGLOM 58 (A) 06/15/2023     Chronic and stable

## 2025-07-03 NOTE — PROGRESS NOTES
Dylan Plasencia (:  1935) is a 89 y.o. male,Established patient, here for evaluation of the following chief complaint(s):  Wrist Pain (LT wrist - red and swollen - injury unknown)         Assessment & Plan  Gouty arthritis of of wrist   Acute condition, new, symptoms consistent with a gouty flare.  Will cover with Medrol Dosepak.  Discussed with daughter this needs to be reevaluated next week.  She does have him scheduled for follow-up with his new PCP Dr. Anurag Ortega on Monday    Orders:    methylPREDNISolone (MEDROL DOSEPACK) 4 MG tablet; Take by mouth.    Chronic kidney disease, stage 3a ([facility])  Lab Results   Component Value Date    LABGLOM 58 (A) 2025    LABGLOM 58 (A) 2025    LABGLOM 64 10/09/2024    LABGLOM 58 (A) 2024    LABGLOM 58 (A) 06/15/2023     Chronic and stable           No follow-ups on file.       Subjective   Dylan is a very pleasant 89-year-old  gentleman with a history significant for multiple medical conditions including gout and Alzheimer's disease.  He presents today with complaints of redness over his left ulnar styloid process.  His daughter states she noted him on video camera walking around holding his wrist as if it was hurting him.  He denies any known injury to this area, denies any past history of gouty arthritic flares to that area.  He denies any other concerns at this time.  Daughter states she has no other concerns either.  He does live at home alone but has caregivers in the home through most of the waking hours of the day.  She is not concerned for his safety, she has video cameras throughout the home.  His previous physician has retired he is going to be starting with a new physician Dr. Corey Ortega through the [facility] on Monday.  He is active under the care of the gerontologist through [facility].      No Known Allergies    Current Outpatient Medications   Medication Sig Dispense Refill    methylPREDNISolone (MEDROL